# Patient Record
Sex: FEMALE | Race: WHITE | NOT HISPANIC OR LATINO | Employment: UNEMPLOYED | URBAN - METROPOLITAN AREA
[De-identification: names, ages, dates, MRNs, and addresses within clinical notes are randomized per-mention and may not be internally consistent; named-entity substitution may affect disease eponyms.]

---

## 2019-04-30 ENCOUNTER — HOSPITAL ENCOUNTER (EMERGENCY)
Facility: HOSPITAL | Age: 59
Discharge: HOME | End: 2019-04-30
Attending: INTERNAL MEDICINE
Payer: MEDICAID

## 2019-04-30 VITALS
OXYGEN SATURATION: 98 % | TEMPERATURE: 99 F | WEIGHT: 122 LBS | HEART RATE: 101 BPM | RESPIRATION RATE: 20 BRPM | BODY MASS INDEX: 20.33 KG/M2 | SYSTOLIC BLOOD PRESSURE: 128 MMHG | HEIGHT: 65 IN | DIASTOLIC BLOOD PRESSURE: 93 MMHG

## 2019-04-30 DIAGNOSIS — F31.9 BIPOLAR 1 DISORDER: Primary | ICD-10-CM

## 2019-04-30 LAB
ALBUMIN SERPL BCP-MCNC: 4.5 G/DL (ref 3.5–5.2)
ALP SERPL-CCNC: 56 U/L (ref 55–135)
ALT SERPL W/O P-5'-P-CCNC: 12 U/L (ref 10–44)
AMPHET+METHAMPHET UR QL: NEGATIVE
ANION GAP SERPL CALC-SCNC: 13 MMOL/L (ref 8–16)
AST SERPL-CCNC: 20 U/L (ref 10–40)
B-HCG UR QL: NEGATIVE
BARBITURATES UR QL SCN>200 NG/ML: NEGATIVE
BASOPHILS # BLD AUTO: 0.06 K/UL (ref 0–0.2)
BASOPHILS NFR BLD: 0.5 % (ref 0–1.9)
BENZODIAZ UR QL SCN>200 NG/ML: NEGATIVE
BILIRUB SERPL-MCNC: 1 MG/DL (ref 0.1–1)
BILIRUB UR QL STRIP: NEGATIVE
BUN SERPL-MCNC: 8 MG/DL (ref 6–20)
BZE UR QL SCN: NEGATIVE
CALCIUM SERPL-MCNC: 9.3 MG/DL (ref 8.7–10.5)
CANNABINOIDS UR QL SCN: NORMAL
CHLORIDE SERPL-SCNC: 103 MMOL/L (ref 95–110)
CLARITY UR: CLEAR
CO2 SERPL-SCNC: 21 MMOL/L (ref 23–29)
COLOR UR: YELLOW
CREAT SERPL-MCNC: 0.9 MG/DL (ref 0.5–1.4)
CREAT UR-MCNC: 220.8 MG/DL (ref 15–325)
DIFFERENTIAL METHOD: ABNORMAL
EOSINOPHIL # BLD AUTO: 0.1 K/UL (ref 0–0.5)
EOSINOPHIL NFR BLD: 0.8 % (ref 0–8)
ERYTHROCYTE [DISTWIDTH] IN BLOOD BY AUTOMATED COUNT: 23.5 % (ref 11.5–14.5)
EST. GFR  (AFRICAN AMERICAN): >60 ML/MIN/1.73 M^2
EST. GFR  (NON AFRICAN AMERICAN): >60 ML/MIN/1.73 M^2
GLUCOSE SERPL-MCNC: 92 MG/DL (ref 70–110)
GLUCOSE UR QL STRIP: NEGATIVE
HCT VFR BLD AUTO: 35.9 % (ref 37–48.5)
HGB BLD-MCNC: 10.6 G/DL (ref 12–16)
HGB UR QL STRIP: NEGATIVE
IMM GRANULOCYTES # BLD AUTO: 0.04 K/UL (ref 0–0.04)
IMM GRANULOCYTES NFR BLD AUTO: 0.3 % (ref 0–0.5)
KETONES UR QL STRIP: ABNORMAL
LEUKOCYTE ESTERASE UR QL STRIP: NEGATIVE
LYMPHOCYTES # BLD AUTO: 2.4 K/UL (ref 1–4.8)
LYMPHOCYTES NFR BLD: 19.3 % (ref 18–48)
MCH RBC QN AUTO: 21.4 PG (ref 27–31)
MCHC RBC AUTO-ENTMCNC: 29.5 G/DL (ref 32–36)
MCV RBC AUTO: 72 FL (ref 82–98)
MONOCYTES # BLD AUTO: 0.8 K/UL (ref 0.3–1)
MONOCYTES NFR BLD: 6.4 % (ref 4–15)
NEUTROPHILS # BLD AUTO: 9.1 K/UL (ref 1.8–7.7)
NEUTROPHILS NFR BLD: 72.7 % (ref 38–73)
NITRITE UR QL STRIP: NEGATIVE
NRBC BLD-RTO: 0 /100 WBC
OPIATES UR QL SCN: NEGATIVE
PCP UR QL SCN>25 NG/ML: NEGATIVE
PH UR STRIP: 5 [PH] (ref 5–8)
PLATELET # BLD AUTO: 371 K/UL (ref 150–350)
PMV BLD AUTO: 9.2 FL (ref 9.2–12.9)
POTASSIUM SERPL-SCNC: 3.6 MMOL/L (ref 3.5–5.1)
PROT SERPL-MCNC: 7.8 G/DL (ref 6–8.4)
PROT UR QL STRIP: NEGATIVE
RBC # BLD AUTO: 4.96 M/UL (ref 4–5.4)
SODIUM SERPL-SCNC: 137 MMOL/L (ref 136–145)
SP GR UR STRIP: 1.02 (ref 1–1.03)
TOXICOLOGY INFORMATION: NORMAL
TSH SERPL DL<=0.005 MIU/L-ACNC: 1.63 UIU/ML (ref 0.34–5.6)
URN SPEC COLLECT METH UR: ABNORMAL
UROBILINOGEN UR STRIP-ACNC: NEGATIVE EU/DL
WBC # BLD AUTO: 12.57 K/UL (ref 3.9–12.7)

## 2019-04-30 PROCEDURE — 25000003 PHARM REV CODE 250: Performed by: INTERNAL MEDICINE

## 2019-04-30 PROCEDURE — 80307 DRUG TEST PRSMV CHEM ANLYZR: CPT

## 2019-04-30 PROCEDURE — 81025 URINE PREGNANCY TEST: CPT

## 2019-04-30 PROCEDURE — 85025 COMPLETE CBC W/AUTO DIFF WBC: CPT

## 2019-04-30 PROCEDURE — 80053 COMPREHEN METABOLIC PANEL: CPT

## 2019-04-30 PROCEDURE — 99283 EMERGENCY DEPT VISIT LOW MDM: CPT

## 2019-04-30 PROCEDURE — 84443 ASSAY THYROID STIM HORMONE: CPT

## 2019-04-30 PROCEDURE — 81003 URINALYSIS AUTO W/O SCOPE: CPT | Mod: 59

## 2019-04-30 PROCEDURE — 36415 COLL VENOUS BLD VENIPUNCTURE: CPT

## 2019-04-30 RX ORDER — CARBAMAZEPINE 100 MG/1
100 TABLET, CHEWABLE ORAL 2 TIMES DAILY
Qty: 60 TABLET | Refills: 11 | Status: SHIPPED | OUTPATIENT
Start: 2019-04-30 | End: 2019-09-30 | Stop reason: SDUPTHER

## 2019-04-30 RX ORDER — OLANZAPINE 5 MG/1
10 TABLET, ORALLY DISINTEGRATING ORAL NIGHTLY
Status: DISCONTINUED | OUTPATIENT
Start: 2019-04-30 | End: 2019-04-30 | Stop reason: HOSPADM

## 2019-04-30 RX ORDER — ALPRAZOLAM 0.25 MG/1
0.25 TABLET ORAL
Status: COMPLETED | OUTPATIENT
Start: 2019-04-30 | End: 2019-04-30

## 2019-04-30 RX ORDER — RISPERIDONE 2 MG/1
2 TABLET ORAL 2 TIMES DAILY
Qty: 60 TABLET | Refills: 0 | Status: SHIPPED | OUTPATIENT
Start: 2019-04-30 | End: 2019-09-30 | Stop reason: SDUPTHER

## 2019-04-30 RX ORDER — OLANZAPINE 10 MG/2ML
2.5 INJECTION, POWDER, FOR SOLUTION INTRAMUSCULAR ONCE AS NEEDED
Status: DISCONTINUED | OUTPATIENT
Start: 2019-04-30 | End: 2019-04-30 | Stop reason: HOSPADM

## 2019-04-30 RX ADMIN — ALPRAZOLAM 0.25 MG: 0.25 TABLET ORAL at 03:04
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
## 2019-09-11 ENCOUNTER — HOSPITAL ENCOUNTER (EMERGENCY)
Facility: HOSPITAL | Age: 59
Discharge: PSYCHIATRIC OR SUBSTANCE USE FACILITY | End: 2019-09-11
Attending: EMERGENCY MEDICINE
Payer: MEDICAID

## 2019-09-11 VITALS
DIASTOLIC BLOOD PRESSURE: 76 MMHG | TEMPERATURE: 99 F | SYSTOLIC BLOOD PRESSURE: 108 MMHG | HEIGHT: 65 IN | OXYGEN SATURATION: 100 % | BODY MASS INDEX: 20.33 KG/M2 | HEART RATE: 73 BPM | RESPIRATION RATE: 16 BRPM | WEIGHT: 122 LBS

## 2019-09-11 DIAGNOSIS — F31.9 BIPOLAR DISORDER WITH PSYCHOTIC FEATURES: Primary | ICD-10-CM

## 2019-09-11 DIAGNOSIS — F12.10 CANNABIS ABUSE: ICD-10-CM

## 2019-09-11 DIAGNOSIS — F22 PARANOIA: ICD-10-CM

## 2019-09-11 LAB
ALBUMIN SERPL BCP-MCNC: 4.1 G/DL (ref 3.5–5.2)
ALP SERPL-CCNC: 62 U/L (ref 55–135)
ALT SERPL W/O P-5'-P-CCNC: 11 U/L (ref 10–44)
AMPHET+METHAMPHET UR QL: NEGATIVE
ANION GAP SERPL CALC-SCNC: 10 MMOL/L (ref 8–16)
APAP SERPL-MCNC: <10 UG/ML (ref 10–20)
AST SERPL-CCNC: 16 U/L (ref 10–40)
BACTERIA #/AREA URNS HPF: ABNORMAL /HPF
BASOPHILS # BLD AUTO: 0.05 K/UL (ref 0–0.2)
BASOPHILS NFR BLD: 0.5 % (ref 0–1.9)
BENZODIAZ UR QL SCN>200 NG/ML: NEGATIVE
BILIRUB SERPL-MCNC: 0.6 MG/DL (ref 0.1–1)
BILIRUB UR QL STRIP: ABNORMAL
BUN SERPL-MCNC: 9 MG/DL (ref 6–20)
BZE UR QL SCN: NEGATIVE
CALCIUM SERPL-MCNC: 9.2 MG/DL (ref 8.7–10.5)
CANNABINOIDS UR QL SCN: NORMAL
CHLORIDE SERPL-SCNC: 104 MMOL/L (ref 95–110)
CLARITY UR: CLEAR
CO2 SERPL-SCNC: 23 MMOL/L (ref 23–29)
COLOR UR: YELLOW
CREAT SERPL-MCNC: 0.7 MG/DL (ref 0.5–1.4)
CREAT UR-MCNC: 262.9 MG/DL (ref 15–325)
DIFFERENTIAL METHOD: ABNORMAL
EOSINOPHIL # BLD AUTO: 0.1 K/UL (ref 0–0.5)
EOSINOPHIL NFR BLD: 1 % (ref 0–8)
ERYTHROCYTE [DISTWIDTH] IN BLOOD BY AUTOMATED COUNT: 24.7 % (ref 11.5–14.5)
EST. GFR  (AFRICAN AMERICAN): >60 ML/MIN/1.73 M^2
EST. GFR  (NON AFRICAN AMERICAN): >60 ML/MIN/1.73 M^2
ETHANOL SERPL-MCNC: <5 MG/DL
GLUCOSE SERPL-MCNC: 101 MG/DL (ref 70–110)
GLUCOSE UR QL STRIP: NEGATIVE
HCT VFR BLD AUTO: 35.6 % (ref 37–48.5)
HGB BLD-MCNC: 10.6 G/DL (ref 12–16)
HGB UR QL STRIP: ABNORMAL
HYALINE CASTS #/AREA URNS LPF: 2 /LPF
IMM GRANULOCYTES # BLD AUTO: 0.04 K/UL (ref 0–0.04)
IMM GRANULOCYTES NFR BLD AUTO: 0.4 % (ref 0–0.5)
KETONES UR QL STRIP: ABNORMAL
LEUKOCYTE ESTERASE UR QL STRIP: NEGATIVE
LYMPHOCYTES # BLD AUTO: 1.8 K/UL (ref 1–4.8)
LYMPHOCYTES NFR BLD: 18.1 % (ref 18–48)
MCH RBC QN AUTO: 21.4 PG (ref 27–31)
MCHC RBC AUTO-ENTMCNC: 29.8 G/DL (ref 32–36)
MCV RBC AUTO: 72 FL (ref 82–98)
MICROSCOPIC COMMENT: ABNORMAL
MONOCYTES # BLD AUTO: 0.5 K/UL (ref 0.3–1)
MONOCYTES NFR BLD: 5.5 % (ref 4–15)
NEUTROPHILS # BLD AUTO: 7.4 K/UL (ref 1.8–7.7)
NEUTROPHILS NFR BLD: 74.5 % (ref 38–73)
NITRITE UR QL STRIP: NEGATIVE
NRBC BLD-RTO: 0 /100 WBC
OPIATES UR QL SCN: NEGATIVE
PCP UR QL SCN>25 NG/ML: NEGATIVE
PH UR STRIP: 5 [PH] (ref 5–8)
PLATELET # BLD AUTO: 375 K/UL (ref 150–350)
PMV BLD AUTO: 9.4 FL (ref 9.2–12.9)
POTASSIUM SERPL-SCNC: 3.8 MMOL/L (ref 3.5–5.1)
PROT SERPL-MCNC: 7.6 G/DL (ref 6–8.4)
PROT UR QL STRIP: ABNORMAL
RBC # BLD AUTO: 4.95 M/UL (ref 4–5.4)
RBC #/AREA URNS HPF: 10 /HPF (ref 0–4)
SALICYLATES SERPL-MCNC: <4 MG/DL (ref 15–30)
SODIUM SERPL-SCNC: 137 MMOL/L (ref 136–145)
SP GR UR STRIP: 1.02 (ref 1–1.03)
SQUAMOUS #/AREA URNS HPF: 5 /HPF
TOXICOLOGY INFORMATION: NORMAL
TSH SERPL DL<=0.005 MIU/L-ACNC: 1.27 UIU/ML (ref 0.34–5.6)
URN SPEC COLLECT METH UR: ABNORMAL
UROBILINOGEN UR STRIP-ACNC: NEGATIVE EU/DL
WBC # BLD AUTO: 9.87 K/UL (ref 3.9–12.7)
WBC #/AREA URNS HPF: 3 /HPF (ref 0–5)

## 2019-09-11 PROCEDURE — 80053 COMPREHEN METABOLIC PANEL: CPT

## 2019-09-11 PROCEDURE — 63600175 PHARM REV CODE 636 W HCPCS: Performed by: EMERGENCY MEDICINE

## 2019-09-11 PROCEDURE — 99285 EMERGENCY DEPT VISIT HI MDM: CPT | Mod: 25

## 2019-09-11 PROCEDURE — 36415 COLL VENOUS BLD VENIPUNCTURE: CPT

## 2019-09-11 PROCEDURE — 85025 COMPLETE CBC W/AUTO DIFF WBC: CPT

## 2019-09-11 PROCEDURE — 80329 ANALGESICS NON-OPIOID 1 OR 2: CPT

## 2019-09-11 PROCEDURE — 84443 ASSAY THYROID STIM HORMONE: CPT

## 2019-09-11 PROCEDURE — 80320 DRUG SCREEN QUANTALCOHOLS: CPT

## 2019-09-11 PROCEDURE — 96372 THER/PROPH/DIAG INJ SC/IM: CPT | Mod: 59

## 2019-09-11 PROCEDURE — 81000 URINALYSIS NONAUTO W/SCOPE: CPT | Mod: 59

## 2019-09-11 PROCEDURE — 80307 DRUG TEST PRSMV CHEM ANLYZR: CPT

## 2019-09-11 RX ORDER — DIPHENHYDRAMINE HYDROCHLORIDE 50 MG/ML
25 INJECTION INTRAMUSCULAR; INTRAVENOUS
Status: COMPLETED | OUTPATIENT
Start: 2019-09-11 | End: 2019-09-11

## 2019-09-11 RX ORDER — LORAZEPAM 2 MG/ML
2 INJECTION INTRAMUSCULAR
Status: COMPLETED | OUTPATIENT
Start: 2019-09-11 | End: 2019-09-11

## 2019-09-11 RX ORDER — HALOPERIDOL 5 MG/ML
5 INJECTION INTRAMUSCULAR
Status: COMPLETED | OUTPATIENT
Start: 2019-09-11 | End: 2019-09-11

## 2019-09-11 RX ADMIN — LORAZEPAM 2 MG: 2 INJECTION INTRAMUSCULAR; INTRAVENOUS at 03:09

## 2019-09-11 RX ADMIN — HALOPERIDOL LACTATE 5 MG: 5 INJECTION, SOLUTION INTRAMUSCULAR at 03:09

## 2019-09-11 RX ADMIN — DIPHENHYDRAMINE HYDROCHLORIDE 25 MG: 50 INJECTION, SOLUTION INTRAMUSCULAR; INTRAVENOUS at 03:09
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
## 2019-09-29 ENCOUNTER — HOSPITAL ENCOUNTER (EMERGENCY)
Facility: HOSPITAL | Age: 59
Discharge: HOME | End: 2019-09-30
Attending: EMERGENCY MEDICINE
Payer: MEDICAID

## 2019-09-29 DIAGNOSIS — F25.9 SCHIZOAFFECTIVE DISORDER, UNSPECIFIED TYPE: Primary | ICD-10-CM

## 2019-09-29 DIAGNOSIS — F31.9 BIPOLAR 1 DISORDER: ICD-10-CM

## 2019-09-29 PROCEDURE — 99284 EMERGENCY DEPT VISIT MOD MDM: CPT | Mod: 25

## 2019-09-29 PROCEDURE — 96372 THER/PROPH/DIAG INJ SC/IM: CPT

## 2019-09-29 RX ORDER — OLANZAPINE 10 MG/2ML
10 INJECTION, POWDER, FOR SOLUTION INTRAMUSCULAR ONCE AS NEEDED
Status: COMPLETED | OUTPATIENT
Start: 2019-09-30 | End: 2019-09-29

## 2019-09-29 RX ADMIN — OLANZAPINE 10 MG: 10 INJECTION, POWDER, FOR SOLUTION INTRAMUSCULAR at 11:09

## 2019-09-30 VITALS
DIASTOLIC BLOOD PRESSURE: 78 MMHG | TEMPERATURE: 98 F | RESPIRATION RATE: 16 BRPM | HEART RATE: 98 BPM | SYSTOLIC BLOOD PRESSURE: 120 MMHG | WEIGHT: 121 LBS | BODY MASS INDEX: 20.16 KG/M2 | OXYGEN SATURATION: 98 % | HEIGHT: 65 IN

## 2019-09-30 PROCEDURE — 25000003 PHARM REV CODE 250: Performed by: EMERGENCY MEDICINE

## 2019-09-30 PROCEDURE — S0166 INJ OLANZAPINE 2.5MG: HCPCS | Performed by: EMERGENCY MEDICINE

## 2019-09-30 RX ORDER — CARBAMAZEPINE 100 MG/1
100 TABLET, CHEWABLE ORAL 2 TIMES DAILY
Qty: 60 TABLET | Refills: 11 | Status: SHIPPED | OUTPATIENT
Start: 2019-09-30 | End: 2020-05-11 | Stop reason: CLARIF

## 2019-09-30 RX ORDER — RISPERIDONE 2 MG/1
2 TABLET ORAL 2 TIMES DAILY
Qty: 60 TABLET | Refills: 0 | Status: ON HOLD | OUTPATIENT
Start: 2019-09-30 | End: 2021-04-12

## 2019-09-30 NOTE — ED NOTES
Pt encouraged to get some rest as she reports poor sleep.  Pt given warm blanket and pillow.  Lights dimmed and door left cracked.
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
## 2019-09-30 NOTE — DISCHARGE INSTRUCTIONS
Resume medications as previous  Follow-up with [facility]  Return to the ER as needed for any acute worsening

## 2019-10-02 ENCOUNTER — HOSPITAL ENCOUNTER (EMERGENCY)
Facility: HOSPITAL | Age: 59
Discharge: HOME | End: 2019-10-02
Attending: FAMILY MEDICINE
Payer: MEDICAID

## 2019-10-02 VITALS
WEIGHT: 130 LBS | HEART RATE: 102 BPM | DIASTOLIC BLOOD PRESSURE: 88 MMHG | BODY MASS INDEX: 23.04 KG/M2 | TEMPERATURE: 98 F | SYSTOLIC BLOOD PRESSURE: 133 MMHG | OXYGEN SATURATION: 98 % | RESPIRATION RATE: 20 BRPM | HEIGHT: 63 IN

## 2019-10-02 DIAGNOSIS — F29 PSYCHOSIS, UNSPECIFIED PSYCHOSIS TYPE: ICD-10-CM

## 2019-10-02 DIAGNOSIS — R45.1 AGITATION: Primary | ICD-10-CM

## 2019-10-02 LAB
ALBUMIN SERPL BCP-MCNC: 3.7 G/DL (ref 3.5–5.2)
ALP SERPL-CCNC: 57 U/L (ref 55–135)
ALT SERPL W/O P-5'-P-CCNC: 11 U/L (ref 10–44)
AMPHET+METHAMPHET UR QL: NEGATIVE
ANION GAP SERPL CALC-SCNC: 12 MMOL/L (ref 8–16)
APAP SERPL-MCNC: <10 UG/ML (ref 10–20)
AST SERPL-CCNC: 15 U/L (ref 10–40)
BARBITURATES UR QL SCN>200 NG/ML: NEGATIVE
BASOPHILS # BLD AUTO: 0.07 K/UL (ref 0–0.2)
BASOPHILS NFR BLD: 0.7 % (ref 0–1.9)
BENZODIAZ UR QL SCN>200 NG/ML: NEGATIVE
BILIRUB SERPL-MCNC: 0.3 MG/DL (ref 0.1–1)
BILIRUB UR QL STRIP: NEGATIVE
BUN SERPL-MCNC: 14 MG/DL (ref 6–20)
BZE UR QL SCN: NEGATIVE
CALCIUM SERPL-MCNC: 8.9 MG/DL (ref 8.7–10.5)
CANNABINOIDS UR QL SCN: NORMAL
CHLORIDE SERPL-SCNC: 101 MMOL/L (ref 95–110)
CLARITY UR: CLEAR
CO2 SERPL-SCNC: 25 MMOL/L (ref 23–29)
COLOR UR: YELLOW
CREAT SERPL-MCNC: 0.8 MG/DL (ref 0.5–1.4)
CREAT UR-MCNC: 106.3 MG/DL (ref 15–325)
DIFFERENTIAL METHOD: ABNORMAL
EOSINOPHIL # BLD AUTO: 0.2 K/UL (ref 0–0.5)
EOSINOPHIL NFR BLD: 1.6 % (ref 0–8)
ERYTHROCYTE [DISTWIDTH] IN BLOOD BY AUTOMATED COUNT: 25.6 % (ref 11.5–14.5)
EST. GFR  (AFRICAN AMERICAN): >60 ML/MIN/1.73 M^2
EST. GFR  (NON AFRICAN AMERICAN): >60 ML/MIN/1.73 M^2
ETHANOL SERPL-MCNC: <5 MG/DL
GLUCOSE SERPL-MCNC: 89 MG/DL (ref 70–110)
GLUCOSE UR QL STRIP: NEGATIVE
HCT VFR BLD AUTO: 29.6 % (ref 37–48.5)
HGB BLD-MCNC: 9.1 G/DL (ref 12–16)
HGB UR QL STRIP: ABNORMAL
IMM GRANULOCYTES # BLD AUTO: 0.03 K/UL (ref 0–0.04)
IMM GRANULOCYTES NFR BLD AUTO: 0.3 % (ref 0–0.5)
KETONES UR QL STRIP: NEGATIVE
LEUKOCYTE ESTERASE UR QL STRIP: NEGATIVE
LYMPHOCYTES # BLD AUTO: 1.9 K/UL (ref 1–4.8)
LYMPHOCYTES NFR BLD: 18.5 % (ref 18–48)
MCH RBC QN AUTO: 22.3 PG (ref 27–31)
MCHC RBC AUTO-ENTMCNC: 30.7 G/DL (ref 32–36)
MCV RBC AUTO: 73 FL (ref 82–98)
MICROSCOPIC COMMENT: ABNORMAL
MONOCYTES # BLD AUTO: 1.1 K/UL (ref 0.3–1)
MONOCYTES NFR BLD: 10.8 % (ref 4–15)
NEUTROPHILS # BLD AUTO: 6.9 K/UL (ref 1.8–7.7)
NEUTROPHILS NFR BLD: 68.1 % (ref 38–73)
NITRITE UR QL STRIP: NEGATIVE
NRBC BLD-RTO: 0 /100 WBC
OPIATES UR QL SCN: NEGATIVE
PCP UR QL SCN>25 NG/ML: NEGATIVE
PH UR STRIP: 7 [PH] (ref 5–8)
PLATELET # BLD AUTO: 312 K/UL (ref 150–350)
PMV BLD AUTO: 9.8 FL (ref 9.2–12.9)
POTASSIUM SERPL-SCNC: 3.9 MMOL/L (ref 3.5–5.1)
PROT SERPL-MCNC: 6.9 G/DL (ref 6–8.4)
PROT UR QL STRIP: NEGATIVE
RBC # BLD AUTO: 4.08 M/UL (ref 4–5.4)
RBC #/AREA URNS HPF: 50 /HPF (ref 0–4)
SODIUM SERPL-SCNC: 138 MMOL/L (ref 136–145)
SP GR UR STRIP: 1.02 (ref 1–1.03)
TOXICOLOGY INFORMATION: NORMAL
TSH SERPL DL<=0.005 MIU/L-ACNC: 1.67 UIU/ML (ref 0.34–5.6)
URN SPEC COLLECT METH UR: ABNORMAL
UROBILINOGEN UR STRIP-ACNC: NEGATIVE EU/DL
WBC # BLD AUTO: 10.1 K/UL (ref 3.9–12.7)

## 2019-10-02 PROCEDURE — 80307 DRUG TEST PRSMV CHEM ANLYZR: CPT

## 2019-10-02 PROCEDURE — 84443 ASSAY THYROID STIM HORMONE: CPT

## 2019-10-02 PROCEDURE — 80320 DRUG SCREEN QUANTALCOHOLS: CPT

## 2019-10-02 PROCEDURE — 81000 URINALYSIS NONAUTO W/SCOPE: CPT | Mod: 59

## 2019-10-02 PROCEDURE — 96372 THER/PROPH/DIAG INJ SC/IM: CPT

## 2019-10-02 PROCEDURE — 25000003 PHARM REV CODE 250: Performed by: FAMILY MEDICINE

## 2019-10-02 PROCEDURE — 99284 EMERGENCY DEPT VISIT MOD MDM: CPT | Mod: 25

## 2019-10-02 PROCEDURE — 80053 COMPREHEN METABOLIC PANEL: CPT

## 2019-10-02 PROCEDURE — 80329 ANALGESICS NON-OPIOID 1 OR 2: CPT

## 2019-10-02 PROCEDURE — 36415 COLL VENOUS BLD VENIPUNCTURE: CPT

## 2019-10-02 PROCEDURE — 63600175 PHARM REV CODE 636 W HCPCS: Performed by: FAMILY MEDICINE

## 2019-10-02 PROCEDURE — 85025 COMPLETE CBC W/AUTO DIFF WBC: CPT

## 2019-10-02 RX ORDER — OLANZAPINE 5 MG/1
10 TABLET, ORALLY DISINTEGRATING ORAL NIGHTLY
Status: DISCONTINUED | OUTPATIENT
Start: 2019-10-02 | End: 2019-10-02

## 2019-10-02 RX ORDER — OLANZAPINE 5 MG/1
10 TABLET, ORALLY DISINTEGRATING ORAL
Status: COMPLETED | OUTPATIENT
Start: 2019-10-02 | End: 2019-10-02

## 2019-10-02 RX ORDER — LORAZEPAM 2 MG/ML
1 INJECTION INTRAMUSCULAR
Status: COMPLETED | OUTPATIENT
Start: 2019-10-02 | End: 2019-10-02

## 2019-10-02 RX ADMIN — LORAZEPAM 1 MG: 2 INJECTION INTRAMUSCULAR; INTRAVENOUS at 06:10

## 2019-10-02 RX ADMIN — OLANZAPINE 10 MG: 5 TABLET, ORALLY DISINTEGRATING ORAL at 06:10
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
## 2019-10-03 NOTE — ED NOTES
Dr Kulkarni at bedside for re-evaluation. Updated on discharge status. Pt awake and alert. Respirations non labored. Calm and cooperative. No acute distress.

## 2019-10-03 NOTE — ED PROVIDER NOTES
Encounter Date: 10/2/2019       History     Chief Complaint   Patient presents with    Mental Health Problem     Patient complaining of hearing voices through the wall coming after her, was seen for same on 9/29/19     58-year-old female presents complaining I just want my lab check to see if there is anything wrong patient states she has been concerned about her neighbors plotting against her she denies any overt suicidal or homicidal ideation she denies auditory or visual hallucinations states she is taking her normal psychiatric medication she has had a history of schizophrenia and bipolar disorder, she is not interested in another psychiatric evaluation or admission and was seen at [facility] in [facility] approximately 3 and half weeks ago having been transferred from the ED here        Review of patient's allergies indicates:   Allergen Reactions    Codeine      Past Medical History:   Diagnosis Date    Bipolar affective disorder, manic, severe, with psychotic behavior 8/14/2014    Depression     Fatigue     History of psychiatric care     History of psychiatric hospitalization     Psychiatric problem     Psychosis 5/13/2015    Schizoaffective disorder      Past Surgical History:   Procedure Laterality Date    ANKLE SURGERY Right      Family History   Problem Relation Age of Onset    No Known Problems Mother     No Known Problems Father     No Known Problems Sister     No Known Problems Brother     No Known Problems Maternal Aunt     No Known Problems Paternal Aunt     No Known Problems Maternal Uncle     No Known Problems Paternal Uncle     No Known Problems Maternal Grandfather     No Known Problems Maternal Grandmother     No Known Problems Paternal Grandfather     No Known Problems Paternal Grandmother     No Known Problems Cousin     ADD / ADHD Neg Hx     Alcohol abuse Neg Hx     Anxiety disorder Neg Hx     Bipolar disorder Neg Hx     Dementia Neg Hx     Depression Neg Hx      Drug abuse Neg Hx     OCD Neg Hx     Paranoid behavior Neg Hx     Physical abuse Neg Hx     Schizophrenia Neg Hx     Seizures Neg Hx     Self injury Neg Hx     Sexual abuse Neg Hx     Suicide Neg Hx      Social History     Tobacco Use    Smoking status: Current Every Day Smoker     Packs/day: 0.50     Years: 34.00     Pack years: 17.00     Types: Cigarettes    Smokeless tobacco: Never Used   Substance Use Topics    Alcohol use: No    Drug use: Yes     Types: Marijuana     Review of Systems   Constitutional: Negative for fever.   HENT: Negative for sore throat.    Respiratory: Negative for shortness of breath.    Cardiovascular: Negative for chest pain.   Gastrointestinal: Negative for nausea.   Genitourinary: Negative for dysuria.   Musculoskeletal: Negative for back pain.   Skin: Negative for rash.   Neurological: Negative for weakness.   Hematological: Does not bruise/bleed easily.   Psychiatric/Behavioral: Negative for agitation, self-injury and sleep disturbance. The patient is nervous/anxious.        Physical Exam     Initial Vitals [10/02/19 1811]   BP Pulse Resp Temp SpO2   133/88 102 20 98.1 °F (36.7 °C) 98 %      MAP       --         Physical Exam    Nursing note and vitals reviewed.  Constitutional: She appears well-developed and well-nourished. She is not diaphoretic. No distress.   HENT:   Head: Normocephalic and atraumatic.   Right Ear: External ear normal.   Left Ear: External ear normal.   Eyes: Pupils are equal, round, and reactive to light. Right eye exhibits no discharge. Left eye exhibits no discharge.   Neck: No tracheal deviation present. No JVD present.   Cardiovascular: Exam reveals no friction rub.    No murmur heard.  Pulmonary/Chest: No stridor. No respiratory distress. She has no wheezes. She has no rales.   Abdominal: Bowel sounds are normal. She exhibits no distension.   Musculoskeletal: Normal range of motion.   Neurological: She is alert.   Skin: Skin is warm.    Psychiatric:   Patient has some mild flight of ideas but is generally oriented and can answer questions appropriately         ED Course   Procedures  Labs Reviewed   CBC W/ AUTO DIFFERENTIAL - Abnormal; Notable for the following components:       Result Value    Hemoglobin 9.1 (*)     Hematocrit 29.6 (*)     Mean Corpuscular Volume 73 (*)     Mean Corpuscular Hemoglobin 22.3 (*)     Mean Corpuscular Hemoglobin Conc 30.7 (*)     RDW 25.6 (*)     Mono # 1.1 (*)     All other components within normal limits   URINALYSIS, REFLEX TO URINE CULTURE - Abnormal; Notable for the following components:    Occult Blood UA 2+ (*)     All other components within normal limits    Narrative:     Preferred Collection Type->Urine, Clean Catch   URINALYSIS MICROSCOPIC - Abnormal; Notable for the following components:    RBC, UA 50 (*)     All other components within normal limits    Narrative:     Preferred Collection Type->Urine, Clean Catch   COMPREHENSIVE METABOLIC PANEL   TSH   DRUG SCREEN PANEL, URINE EMERGENCY    Narrative:     Preferred Collection Type->Urine, Clean Catch   ALCOHOL,MEDICAL (ETHANOL)   ACETAMINOPHEN LEVEL          Imaging Results    None                               Clinical Impression:       ICD-10-CM ICD-9-CM   1. Agitation R45.1 307.9   2. Psychosis, unspecified psychosis type F29 298.9                                Trey Kulkarni MD  10/03/19 0326       Trey Kulkarni MD  10/11/19 1122

## 2019-10-08 ENCOUNTER — HOSPITAL ENCOUNTER (EMERGENCY)
Facility: HOSPITAL | Age: 59
Discharge: HOME | End: 2019-10-08
Attending: INTERNAL MEDICINE
Payer: MEDICAID

## 2019-10-08 VITALS
BODY MASS INDEX: 20.99 KG/M2 | WEIGHT: 126 LBS | HEIGHT: 65 IN | RESPIRATION RATE: 16 BRPM | SYSTOLIC BLOOD PRESSURE: 119 MMHG | TEMPERATURE: 98 F | DIASTOLIC BLOOD PRESSURE: 85 MMHG | HEART RATE: 116 BPM | OXYGEN SATURATION: 98 %

## 2019-10-08 DIAGNOSIS — R00.2 PALPITATIONS: ICD-10-CM

## 2019-10-08 DIAGNOSIS — F41.9 ANXIETY: Primary | ICD-10-CM

## 2019-10-08 DIAGNOSIS — D50.0 IRON DEFICIENCY ANEMIA DUE TO CHRONIC BLOOD LOSS: ICD-10-CM

## 2019-10-08 DIAGNOSIS — R06.02 SOB (SHORTNESS OF BREATH): ICD-10-CM

## 2019-10-08 LAB
BASOPHILS # BLD AUTO: 0.06 K/UL (ref 0–0.2)
BASOPHILS NFR BLD: 0.8 % (ref 0–1.9)
DIFFERENTIAL METHOD: ABNORMAL
EOSINOPHIL # BLD AUTO: 0.1 K/UL (ref 0–0.5)
EOSINOPHIL NFR BLD: 1.4 % (ref 0–8)
ERYTHROCYTE [DISTWIDTH] IN BLOOD BY AUTOMATED COUNT: 25.1 % (ref 11.5–14.5)
HCT VFR BLD AUTO: 31.2 % (ref 37–48.5)
HGB BLD-MCNC: 9.6 G/DL (ref 12–16)
IMM GRANULOCYTES # BLD AUTO: 0.04 K/UL (ref 0–0.04)
IMM GRANULOCYTES NFR BLD AUTO: 0.5 % (ref 0–0.5)
IRON SERPL-MCNC: 22 UG/DL (ref 30–160)
LYMPHOCYTES # BLD AUTO: 1.8 K/UL (ref 1–4.8)
LYMPHOCYTES NFR BLD: 23.5 % (ref 18–48)
MCH RBC QN AUTO: 22.3 PG (ref 27–31)
MCHC RBC AUTO-ENTMCNC: 30.8 G/DL (ref 32–36)
MCV RBC AUTO: 73 FL (ref 82–98)
MONOCYTES # BLD AUTO: 0.9 K/UL (ref 0.3–1)
MONOCYTES NFR BLD: 12.2 % (ref 4–15)
NEUTROPHILS # BLD AUTO: 4.7 K/UL (ref 1.8–7.7)
NEUTROPHILS NFR BLD: 61.6 % (ref 38–73)
NRBC BLD-RTO: 0 /100 WBC
PLATELET # BLD AUTO: 416 K/UL (ref 150–350)
PMV BLD AUTO: 8.8 FL (ref 9.2–12.9)
RBC # BLD AUTO: 4.3 M/UL (ref 4–5.4)
SATURATED IRON: 4 % (ref 20–50)
TOTAL IRON BINDING CAPACITY: 533 UG/DL (ref 250–450)
TRANSFERRIN SERPL-MCNC: 360 MG/DL (ref 200–375)
WBC # BLD AUTO: 7.65 K/UL (ref 3.9–12.7)

## 2019-10-08 PROCEDURE — 93005 ELECTROCARDIOGRAM TRACING: CPT

## 2019-10-08 PROCEDURE — 85025 COMPLETE CBC W/AUTO DIFF WBC: CPT

## 2019-10-08 PROCEDURE — 93010 ELECTROCARDIOGRAM REPORT: CPT | Mod: ,,, | Performed by: INTERNAL MEDICINE

## 2019-10-08 PROCEDURE — 99285 EMERGENCY DEPT VISIT HI MDM: CPT | Mod: 25

## 2019-10-08 PROCEDURE — 71046 X-RAY EXAM CHEST 2 VIEWS: CPT | Mod: 26,,, | Performed by: RADIOLOGY

## 2019-10-08 PROCEDURE — 25000003 PHARM REV CODE 250: Performed by: INTERNAL MEDICINE

## 2019-10-08 PROCEDURE — 93010 EKG 12-LEAD: ICD-10-PCS | Mod: ,,, | Performed by: INTERNAL MEDICINE

## 2019-10-08 PROCEDURE — 71046 X-RAY EXAM CHEST 2 VIEWS: CPT | Mod: TC,FY

## 2019-10-08 PROCEDURE — 71046 XR CHEST PA AND LATERAL: ICD-10-PCS | Mod: 26,,, | Performed by: RADIOLOGY

## 2019-10-08 PROCEDURE — 83540 ASSAY OF IRON: CPT

## 2019-10-08 PROCEDURE — 36415 COLL VENOUS BLD VENIPUNCTURE: CPT

## 2019-10-08 RX ORDER — ALPRAZOLAM 0.25 MG/1
0.25 TABLET ORAL
Status: COMPLETED | OUTPATIENT
Start: 2019-10-08 | End: 2019-10-08

## 2019-10-08 RX ORDER — LABETALOL 100 MG/1
200 TABLET, FILM COATED ORAL
Status: COMPLETED | OUTPATIENT
Start: 2019-10-08 | End: 2019-10-08

## 2019-10-08 RX ADMIN — ALPRAZOLAM 0.25 MG: 0.25 TABLET ORAL at 05:10

## 2019-10-08 RX ADMIN — LABETALOL HYDROCHLORIDE 200 MG: 100 TABLET, FILM COATED ORAL at 05:10

## 2019-10-08 NOTE — ED PROVIDER NOTES
Encounter Date: 10/8/2019       History     Chief Complaint   Patient presents with    Anxiety     felt like heart was racing.     Patient presented to this ED complaining of heart racing and severe anxiety. She states the medicine she was prescribed are not working.  The medicines were risperidone 3 mg and car band was seen 100 mg.  These are prescribed by the ER physician.  She has not established any primary care and has not seen [facility].  She has a diagnosis of bipolar or schizo affective disorder.  She has not having delusions or hallucinations she just did not feel right she states the for heart feels like it is has pressure in his racing.  She has no chest pain.    The patient is alert oriented to person place and time.  She has no delusions or hallucinations.  Her speech is fluent and coherent.  She has a hard time describing her discomfort but more as a palpitation or pressure.  She states she just can't sleep.  She has been up several nights in a row.            Review of patient's allergies indicates:   Allergen Reactions    Codeine      Past Medical History:   Diagnosis Date    Bipolar affective disorder, manic, severe, with psychotic behavior 8/14/2014    Depression     Fatigue     History of psychiatric care     History of psychiatric hospitalization     Psychiatric problem     Psychosis 5/13/2015    Schizoaffective disorder      Past Surgical History:   Procedure Laterality Date    ANKLE SURGERY Right      Family History   Problem Relation Age of Onset    No Known Problems Mother     No Known Problems Father     No Known Problems Sister     No Known Problems Brother     No Known Problems Maternal Aunt     No Known Problems Paternal Aunt     No Known Problems Maternal Uncle     No Known Problems Paternal Uncle     No Known Problems Maternal Grandfather     No Known Problems Maternal Grandmother     No Known Problems Paternal Grandfather     No Known Problems  Paternal Grandmother     No Known Problems Cousin     ADD / ADHD Neg Hx     Alcohol abuse Neg Hx     Anxiety disorder Neg Hx     Bipolar disorder Neg Hx     Dementia Neg Hx     Depression Neg Hx     Drug abuse Neg Hx     OCD Neg Hx     Paranoid behavior Neg Hx     Physical abuse Neg Hx     Schizophrenia Neg Hx     Seizures Neg Hx     Self injury Neg Hx     Sexual abuse Neg Hx     Suicide Neg Hx      Social History     Tobacco Use    Smoking status: Current Every Day Smoker     Packs/day: 0.50     Years: 34.00     Pack years: 17.00     Types: Cigarettes    Smokeless tobacco: Never Used   Substance Use Topics    Alcohol use: No    Drug use: Yes     Types: Marijuana     Review of Systems   Constitutional: Negative for fever.   HENT: Negative for sore throat.    Respiratory: Negative for shortness of breath.    Cardiovascular: Negative for chest pain.   Gastrointestinal: Negative for nausea.   Genitourinary: Negative for dysuria.   Musculoskeletal: Negative for back pain.   Skin: Negative for rash.   Neurological: Positive for dizziness. Negative for weakness.   Hematological: Does not bruise/bleed easily.   Psychiatric/Behavioral: Positive for dysphoric mood. The patient is nervous/anxious.    All other systems reviewed and are negative.      Physical Exam     Initial Vitals [10/08/19 1658]   BP Pulse Resp Temp SpO2   119/85 (!) 116 16 98.2 °F (36.8 °C) 98 %      MAP       --         Physical Exam    Nursing note and vitals reviewed.  Constitutional: Vital signs are normal. She appears well-developed and well-nourished. She is active and cooperative.   HENT:   Head: Normocephalic and atraumatic.   Eyes: Conjunctivae, EOM and lids are normal. Pupils are equal, round, and reactive to light. Lids are everted and swept, no foreign bodies found.   Neck: Trachea normal, normal range of motion and full passive range of motion without pain. Neck supple.   Cardiovascular: Normal rate, regular rhythm, S1  normal, S2 normal, normal heart sounds, intact distal pulses and normal pulses.  No extrasystoles are present.    Pulmonary/Chest: Breath sounds normal.   Abdominal: Soft. Normal appearance and bowel sounds are normal.   Musculoskeletal: Normal range of motion.   Neurological: She is alert and oriented to person, place, and time. She has normal reflexes. GCS eye subscore is 4. GCS verbal subscore is 5. GCS motor subscore is 6.   Skin: Skin is warm, dry and intact. Capillary refill takes less than 2 seconds.   Psychiatric: Her speech is rapid and/or pressured and tangential. She is hyperactive. Cognition and memory are normal. She expresses inappropriate judgment.         ED Course   Procedures  Labs Reviewed   CBC W/ AUTO DIFFERENTIAL - Abnormal; Notable for the following components:       Result Value    Hemoglobin 9.6 (*)     Hematocrit 31.2 (*)     Mean Corpuscular Volume 73 (*)     Mean Corpuscular Hemoglobin 22.3 (*)     Mean Corpuscular Hemoglobin Conc 30.8 (*)     RDW 25.1 (*)     Platelets 416 (*)     MPV 8.8 (*)     All other components within normal limits   IRON AND TIBC     EKG Readings: (Independently Interpreted)   Initial Reading: No STEMI. Rhythm: Normal Sinus Rhythm. Ectopy: No Ectopy. Conduction: Normal. ST Segments: Normal ST Segments. T Waves: Normal. Axis: Normal. Clinical Impression: Normal Sinus Rhythm       Imaging Results          X-Ray Chest PA And Lateral (In process)                  Medical Decision Making:   Clinical Tests:   Lab Tests: Ordered and Reviewed  The following lab test(s) were unremarkable: CBC and CMP       <> Summary of Lab: Laboratory shows evidence of an iron deficient anemia.  Medical Tests: Ordered and Reviewed  ED Management:  EKG is normal.    Patient comes in with anxiety. She improved Xanax and labetalol.  She does have evidence of an iron deficiency anemia.  This requires for further workup and treatment.  Anxiety is 1 of the side effects of iron deficiency  anemia.  Patient was seen and discharged by Dr. Barroso and recommended continuing her current medications which is Risperdal and Tegretol.    Patient should be contacted and iron prescribed every other day.                      Clinical Impression:       ICD-10-CM ICD-9-CM   1. Anxiety F41.9 300.00   2. Palpitations R00.2 785.1   3. SOB (shortness of breath) R06.02 786.05   4. Iron deficiency anemia due to chronic blood loss D50.0 280.0         Disposition:   Disposition: Discharged  Condition: Stable                        Silvano Costello MD  10/09/19 0710

## 2019-10-08 NOTE — ED TRIAGE NOTES
Pt stated she feels frantic, that she is not taking the xanax.  The nerve medication she was given she says it is not working and making her normal.

## 2020-01-19 ENCOUNTER — HOSPITAL ENCOUNTER (EMERGENCY)
Facility: HOSPITAL | Age: 60
Discharge: PSYCHIATRIC OR SUBSTANCE USE FACILITY | End: 2020-01-19
Attending: EMERGENCY MEDICINE
Payer: MEDICAID

## 2020-01-19 VITALS
BODY MASS INDEX: 22.2 KG/M2 | TEMPERATURE: 98 F | HEIGHT: 64 IN | SYSTOLIC BLOOD PRESSURE: 102 MMHG | WEIGHT: 130 LBS | HEART RATE: 77 BPM | OXYGEN SATURATION: 99 % | RESPIRATION RATE: 19 BRPM | DIASTOLIC BLOOD PRESSURE: 74 MMHG

## 2020-01-19 DIAGNOSIS — F20.0 PARANOID SCHIZOPHRENIA: ICD-10-CM

## 2020-01-19 DIAGNOSIS — F23 ACUTE PSYCHOSIS: ICD-10-CM

## 2020-01-19 DIAGNOSIS — M54.9 BACK PAIN, UNSPECIFIED BACK LOCATION, UNSPECIFIED BACK PAIN LATERALITY, UNSPECIFIED CHRONICITY: Primary | ICD-10-CM

## 2020-01-19 DIAGNOSIS — R44.0 SEVERE AUDITORY HALLUCINATIONS: ICD-10-CM

## 2020-01-19 DIAGNOSIS — R45.850 HOMICIDAL THOUGHTS: ICD-10-CM

## 2020-01-19 DIAGNOSIS — R44.1 CONTINUOUS VISUAL HALLUCINATIONS: ICD-10-CM

## 2020-01-19 LAB
ALBUMIN SERPL BCP-MCNC: 3.8 G/DL (ref 3.5–5.2)
ALP SERPL-CCNC: 56 U/L (ref 55–135)
ALT SERPL W/O P-5'-P-CCNC: 14 U/L (ref 10–44)
AMPHET+METHAMPHET UR QL: NEGATIVE
ANION GAP SERPL CALC-SCNC: 9 MMOL/L (ref 8–16)
APAP SERPL-MCNC: <10 UG/ML (ref 10–20)
AST SERPL-CCNC: 16 U/L (ref 10–40)
B-HCG UR QL: NEGATIVE
BARBITURATES UR QL SCN>200 NG/ML: NEGATIVE
BASOPHILS # BLD AUTO: 0.09 K/UL (ref 0–0.2)
BASOPHILS NFR BLD: 0.7 % (ref 0–1.9)
BENZODIAZ UR QL SCN>200 NG/ML: NEGATIVE
BILIRUB SERPL-MCNC: 0.4 MG/DL (ref 0.1–1)
BILIRUB UR QL STRIP: NEGATIVE
BUN SERPL-MCNC: 12 MG/DL (ref 6–20)
BZE UR QL SCN: NEGATIVE
CALCIUM SERPL-MCNC: 8.5 MG/DL (ref 8.7–10.5)
CANNABINOIDS UR QL SCN: NORMAL
CHLORIDE SERPL-SCNC: 101 MMOL/L (ref 95–110)
CLARITY UR: ABNORMAL
CO2 SERPL-SCNC: 25 MMOL/L (ref 23–29)
COLOR UR: YELLOW
CREAT SERPL-MCNC: 0.8 MG/DL (ref 0.5–1.4)
CREAT UR-MCNC: 151.5 MG/DL (ref 15–325)
DIFFERENTIAL METHOD: ABNORMAL
EOSINOPHIL # BLD AUTO: 0.3 K/UL (ref 0–0.5)
EOSINOPHIL NFR BLD: 2.6 % (ref 0–8)
ERYTHROCYTE [DISTWIDTH] IN BLOOD BY AUTOMATED COUNT: 22.7 % (ref 11.5–14.5)
EST. GFR  (AFRICAN AMERICAN): >60 ML/MIN/1.73 M^2
EST. GFR  (NON AFRICAN AMERICAN): >60 ML/MIN/1.73 M^2
ETHANOL SERPL-MCNC: <5 MG/DL
GLUCOSE SERPL-MCNC: 77 MG/DL (ref 70–110)
GLUCOSE UR QL STRIP: NEGATIVE
HCT VFR BLD AUTO: 33.7 % (ref 37–48.5)
HGB BLD-MCNC: 10.3 G/DL (ref 12–16)
HGB UR QL STRIP: NEGATIVE
IMM GRANULOCYTES # BLD AUTO: 0.04 K/UL (ref 0–0.04)
IMM GRANULOCYTES NFR BLD AUTO: 0.3 % (ref 0–0.5)
KETONES UR QL STRIP: ABNORMAL
LEUKOCYTE ESTERASE UR QL STRIP: NEGATIVE
LYMPHOCYTES # BLD AUTO: 3.3 K/UL (ref 1–4.8)
LYMPHOCYTES NFR BLD: 27 % (ref 18–48)
MCH RBC QN AUTO: 24.2 PG (ref 27–31)
MCHC RBC AUTO-ENTMCNC: 30.6 G/DL (ref 32–36)
MCV RBC AUTO: 79 FL (ref 82–98)
MONOCYTES # BLD AUTO: 1.1 K/UL (ref 0.3–1)
MONOCYTES NFR BLD: 9 % (ref 4–15)
NEUTROPHILS # BLD AUTO: 7.4 K/UL (ref 1.8–7.7)
NEUTROPHILS NFR BLD: 60.4 % (ref 38–73)
NITRITE UR QL STRIP: NEGATIVE
NRBC BLD-RTO: 0 /100 WBC
OPIATES UR QL SCN: NEGATIVE
PCP UR QL SCN>25 NG/ML: NEGATIVE
PH UR STRIP: 6 [PH] (ref 5–8)
PLATELET # BLD AUTO: 386 K/UL (ref 150–350)
PMV BLD AUTO: 9.4 FL (ref 9.2–12.9)
POTASSIUM SERPL-SCNC: 3.2 MMOL/L (ref 3.5–5.1)
PROT SERPL-MCNC: 7.4 G/DL (ref 6–8.4)
PROT UR QL STRIP: NEGATIVE
RBC # BLD AUTO: 4.25 M/UL (ref 4–5.4)
SALICYLATES SERPL-MCNC: <4 MG/DL (ref 15–30)
SODIUM SERPL-SCNC: 135 MMOL/L (ref 136–145)
SP GR UR STRIP: 1.02 (ref 1–1.03)
TOXICOLOGY INFORMATION: NORMAL
TSH SERPL DL<=0.005 MIU/L-ACNC: 2.98 UIU/ML (ref 0.34–5.6)
URN SPEC COLLECT METH UR: ABNORMAL
UROBILINOGEN UR STRIP-ACNC: NEGATIVE EU/DL
WBC # BLD AUTO: 12.3 K/UL (ref 3.9–12.7)

## 2020-01-19 PROCEDURE — 25000003 PHARM REV CODE 250: Performed by: NURSE PRACTITIONER

## 2020-01-19 PROCEDURE — 81025 URINE PREGNANCY TEST: CPT

## 2020-01-19 PROCEDURE — 80307 DRUG TEST PRSMV CHEM ANLYZR: CPT

## 2020-01-19 PROCEDURE — 63600175 PHARM REV CODE 636 W HCPCS: Performed by: EMERGENCY MEDICINE

## 2020-01-19 PROCEDURE — 85025 COMPLETE CBC W/AUTO DIFF WBC: CPT

## 2020-01-19 PROCEDURE — 80053 COMPREHEN METABOLIC PANEL: CPT

## 2020-01-19 PROCEDURE — 80320 DRUG SCREEN QUANTALCOHOLS: CPT

## 2020-01-19 PROCEDURE — 63600175 PHARM REV CODE 636 W HCPCS: Performed by: NURSE PRACTITIONER

## 2020-01-19 PROCEDURE — 25000003 PHARM REV CODE 250: Performed by: EMERGENCY MEDICINE

## 2020-01-19 PROCEDURE — 84443 ASSAY THYROID STIM HORMONE: CPT

## 2020-01-19 PROCEDURE — 96372 THER/PROPH/DIAG INJ SC/IM: CPT

## 2020-01-19 PROCEDURE — 36415 COLL VENOUS BLD VENIPUNCTURE: CPT

## 2020-01-19 PROCEDURE — 80329 ANALGESICS NON-OPIOID 1 OR 2: CPT

## 2020-01-19 PROCEDURE — 99285 EMERGENCY DEPT VISIT HI MDM: CPT | Mod: 25

## 2020-01-19 PROCEDURE — 81003 URINALYSIS AUTO W/O SCOPE: CPT

## 2020-01-19 RX ORDER — KETOROLAC TROMETHAMINE 30 MG/ML
30 INJECTION, SOLUTION INTRAMUSCULAR; INTRAVENOUS
Status: COMPLETED | OUTPATIENT
Start: 2020-01-19 | End: 2020-01-19

## 2020-01-19 RX ORDER — LORAZEPAM 2 MG/ML
2 INJECTION INTRAMUSCULAR
Status: COMPLETED | OUTPATIENT
Start: 2020-01-19 | End: 2020-01-19

## 2020-01-19 RX ORDER — DIPHENHYDRAMINE HYDROCHLORIDE 50 MG/ML
50 INJECTION INTRAMUSCULAR; INTRAVENOUS
Status: COMPLETED | OUTPATIENT
Start: 2020-01-19 | End: 2020-01-19

## 2020-01-19 RX ORDER — POTASSIUM CHLORIDE 20 MEQ/1
40 TABLET, EXTENDED RELEASE ORAL
Status: COMPLETED | OUTPATIENT
Start: 2020-01-19 | End: 2020-01-19

## 2020-01-19 RX ORDER — HYDROXYZINE PAMOATE 25 MG/1
25 CAPSULE ORAL
Status: COMPLETED | OUTPATIENT
Start: 2020-01-19 | End: 2020-01-19

## 2020-01-19 RX ORDER — HALOPERIDOL 5 MG/ML
5 INJECTION INTRAMUSCULAR
Status: COMPLETED | OUTPATIENT
Start: 2020-01-19 | End: 2020-01-19

## 2020-01-19 RX ADMIN — LORAZEPAM 2 MG: 2 INJECTION INTRAMUSCULAR; INTRAVENOUS at 01:01

## 2020-01-19 RX ADMIN — HYDROXYZINE PAMOATE 25 MG: 25 CAPSULE ORAL at 11:01

## 2020-01-19 RX ADMIN — POTASSIUM CHLORIDE 40 MEQ: 1500 TABLET, EXTENDED RELEASE ORAL at 03:01

## 2020-01-19 RX ADMIN — KETOROLAC TROMETHAMINE 30 MG: 30 INJECTION, SOLUTION INTRAMUSCULAR at 11:01

## 2020-01-19 RX ADMIN — HALOPERIDOL LACTATE 5 MG: 5 INJECTION, SOLUTION INTRAMUSCULAR at 01:01

## 2020-01-19 RX ADMIN — DIPHENHYDRAMINE HYDROCHLORIDE 50 MG: 50 INJECTION INTRAMUSCULAR; INTRAVENOUS at 01:01

## 2020-01-19 NOTE — ED NOTES
Called CPT spoke with Josh to inform her that patient has been accepted to [facility] in [place] but not until after 7pm.

## 2020-01-19 NOTE — ED NOTES
"Patient talking to someone that is not really there. States "They are in these fucking walls. I don't want them anywhere around me. They are going to think I am the police. I will put that bitch in a coma and she will wish she was dead. Try me."    Successfully de-escalated patient. Dr. Moeller notified. New orders being placed for medication.   Sebastian Diaz, Primary RN notified.   "

## 2020-01-19 NOTE — ED PROVIDER NOTES
Encounter Date: 1/19/2020       History   No chief complaint on file.    Ambulatory to ED with complaints of lower back pain x 3 days.          Review of patient's allergies indicates:   Allergen Reactions    Codeine      Past Medical History:   Diagnosis Date    Bipolar affective disorder, manic, severe, with psychotic behavior 8/14/2014    Depression     Fatigue     History of psychiatric care     History of psychiatric hospitalization     Psychiatric problem     Psychosis 5/13/2015    Schizoaffective disorder      Past Surgical History:   Procedure Laterality Date    ANKLE SURGERY Right      Family History   Problem Relation Age of Onset    No Known Problems Mother     No Known Problems Father     No Known Problems Sister     No Known Problems Brother     No Known Problems Maternal Aunt     No Known Problems Paternal Aunt     No Known Problems Maternal Uncle     No Known Problems Paternal Uncle     No Known Problems Maternal Grandfather     No Known Problems Maternal Grandmother     No Known Problems Paternal Grandfather     No Known Problems Paternal Grandmother     No Known Problems Cousin     ADD / ADHD Neg Hx     Alcohol abuse Neg Hx     Anxiety disorder Neg Hx     Bipolar disorder Neg Hx     Dementia Neg Hx     Depression Neg Hx     Drug abuse Neg Hx     OCD Neg Hx     Paranoid behavior Neg Hx     Physical abuse Neg Hx     Schizophrenia Neg Hx     Seizures Neg Hx     Self injury Neg Hx     Sexual abuse Neg Hx     Suicide Neg Hx      Social History     Tobacco Use    Smoking status: Current Every Day Smoker     Packs/day: 0.50     Years: 34.00     Pack years: 17.00     Types: Cigarettes    Smokeless tobacco: Never Used   Substance Use Topics    Alcohol use: No    Drug use: Yes     Types: Marijuana     Review of Systems   Constitutional: Negative.    HENT: Negative.    Eyes: Negative.    Respiratory: Negative.    Cardiovascular: Negative.    Gastrointestinal: Negative.     Endocrine: Negative.    Genitourinary: Negative.    Musculoskeletal: Positive for back pain.        Reports L lower back pain without known mechanism of trauma   Skin: Negative.    Allergic/Immunologic: Negative.    Neurological: Negative.    Hematological: Negative.    Psychiatric/Behavioral: Positive for agitation. The patient is nervous/anxious and is hyperactive.        Physical Exam     Initial Vitals   BP Pulse Resp Temp SpO2   -- -- -- -- --      MAP       --         Physical Exam    Nursing note and vitals reviewed.  Constitutional: She appears well-developed and well-nourished.   HENT:   Head: Normocephalic and atraumatic.   Right Ear: External ear normal.   Left Ear: External ear normal.   Nose: Nose normal.   Mouth/Throat: Oropharynx is clear and moist.   Eyes: Conjunctivae and EOM are normal. Pupils are equal, round, and reactive to light.   Neck: Normal range of motion. Neck supple.   Cardiovascular: Normal rate, regular rhythm, normal heart sounds and intact distal pulses.   Pulmonary/Chest: Breath sounds normal.   Abdominal: Soft. Bowel sounds are normal.   Musculoskeletal: Normal range of motion.   Neurological: She is alert and oriented to person, place, and time. She has normal strength and normal reflexes.   Skin: Capillary refill takes 2 to 3 seconds.   Psychiatric: She has a normal mood and affect. Her behavior is normal. Judgment and thought content normal.   During reexamination the patient was noted to be actively hallucinating and talking to the walls which she states were being manipulated by her antagonists which she cannot identify.           ED Course   Procedures  Labs Reviewed - No data to display       Imaging Results    None          Medical Decision Making:   Initial Assessment:   This anxious lady presents with complaints of lower back pain which she believes may be caused by stress of a socially stressful living situation.  She denies feeling suicidal or homicidal but seems  fixated on the behavior of her neighbors.  She denies hallucinations of any type, her med history includes Risperdone and tegretol which she takes for mood disorder.  She admits she has been off her medicine for 3 months.    Differential Diagnosis:   Back pain, anxiety,   ED Management:  During reexamination the patient was noted to be actively hallucinating and talking to the walls which she states were being manipulated by her antagonists which she cannot identify.  She was moved to ED room 9, Dr Moeller notified of psychotic decompensation.                                   Clinical Impression:       ICD-10-CM ICD-9-CM   1. Back pain, unspecified back location, unspecified back pain laterality, unspecified chronicity M54.9 724.5                             Donaldo Gao NP  01/19/20 1144       Donaldo Gao NP  01/19/20 1220

## 2020-01-19 NOTE — ED NOTES
Patient appears restless. Lying in bed with eyes closed. Observed rise and fall of chest. No need or concerns at this time. Will continue to monitor.

## 2020-01-19 NOTE — ED NOTES
"Patient states "If I get my hands on him, he is going to custodial or I am going to custodial."   Patient continuously laughing and making faces as she responds to auditory hallucinations.   "

## 2020-01-19 NOTE — ED PROVIDER NOTES
Encounter Date: 1/19/2020       History     Chief Complaint   Patient presents with    Back Pain     Patient complaining of lower back pain from stress.     59-year-old female with past medical history of bipolar affective disorder, depression, schizoaffective disorder presents to the ED as a walk-in for back pain. However upon interview and examination the patient, she is having active auditory and visual hallucinations instructing her to kill the demons.  States these demons are telling her to kill people, and while she knows this is wrong, she feels compelled to do so.  History is limited due to psychiatric illness.        Review of patient's allergies indicates:   Allergen Reactions    Codeine      Past Medical History:   Diagnosis Date    Bipolar affective disorder, manic, severe, with psychotic behavior 8/14/2014    Depression     Fatigue     History of psychiatric care     History of psychiatric hospitalization     Psychiatric problem     Psychosis 5/13/2015    Schizoaffective disorder      Past Surgical History:   Procedure Laterality Date    ANKLE SURGERY Right      Family History   Problem Relation Age of Onset    No Known Problems Mother     No Known Problems Father     No Known Problems Sister     No Known Problems Brother     No Known Problems Maternal Aunt     No Known Problems Paternal Aunt     No Known Problems Maternal Uncle     No Known Problems Paternal Uncle     No Known Problems Maternal Grandfather     No Known Problems Maternal Grandmother     No Known Problems Paternal Grandfather     No Known Problems Paternal Grandmother     No Known Problems Cousin     ADD / ADHD Neg Hx     Alcohol abuse Neg Hx     Anxiety disorder Neg Hx     Bipolar disorder Neg Hx     Dementia Neg Hx     Depression Neg Hx     Drug abuse Neg Hx     OCD Neg Hx     Paranoid behavior Neg Hx     Physical abuse Neg Hx     Schizophrenia Neg Hx     Seizures Neg Hx     Self injury Neg Hx      Sexual abuse Neg Hx     Suicide Neg Hx      Social History     Tobacco Use    Smoking status: Current Every Day Smoker     Packs/day: 0.50     Years: 34.00     Pack years: 17.00     Types: Cigarettes    Smokeless tobacco: Never Used   Substance Use Topics    Alcohol use: No    Drug use: Yes     Types: Marijuana     Review of Systems   Unable to perform ROS: Psychiatric disorder       Physical Exam     Initial Vitals [01/19/20 1123]   BP Pulse Resp Temp SpO2   (!) 156/92 98 20 98.6 °F (37 °C) 98 %      MAP       --         Physical Exam    Nursing note and vitals reviewed.  Constitutional: She appears well-developed and well-nourished. She is not diaphoretic. No distress.   HENT:   Head: Normocephalic and atraumatic.   Right Ear: External ear normal.   Left Ear: External ear normal.   Nose: Nose normal.   Mouth/Throat: Oropharynx is clear and moist.   Eyes: Conjunctivae are normal. Pupils are equal, round, and reactive to light. No scleral icterus.   Neck: Normal range of motion. Neck supple. No JVD present.   Cardiovascular: Normal rate, regular rhythm, normal heart sounds and intact distal pulses.   Pulmonary/Chest: Breath sounds normal. No respiratory distress. She has no wheezes. She has no rhonchi. She has no rales. She exhibits no tenderness.   Abdominal: Soft. Bowel sounds are normal. She exhibits no distension. There is no tenderness. There is no rebound and no guarding.   Musculoskeletal: Normal range of motion. She exhibits no edema or tenderness.   Lymphadenopathy:     She has no cervical adenopathy.   Neurological: She is alert and oriented to person, place, and time. GCS score is 15. GCS eye subscore is 4. GCS verbal subscore is 5. GCS motor subscore is 6.   Skin: Skin is warm and dry. Capillary refill takes less than 2 seconds. No rash noted. No erythema. No pallor.   Psychiatric: Her affect is labile. Her speech is rapid and/or pressured and tangential. She is agitated and hyperactive. Thought  content is paranoid and delusional. Cognition and memory are impaired. She expresses impulsivity and inappropriate judgment. She expresses homicidal ideation. She expresses no suicidal ideation. She expresses no suicidal plans and no homicidal plans.         ED Course   Procedures  Labs Reviewed   CBC W/ AUTO DIFFERENTIAL - Abnormal; Notable for the following components:       Result Value    Hemoglobin 10.3 (*)     Hematocrit 33.7 (*)     Mean Corpuscular Volume 79 (*)     Mean Corpuscular Hemoglobin 24.2 (*)     Mean Corpuscular Hemoglobin Conc 30.6 (*)     RDW 22.7 (*)     Platelets 386 (*)     Mono # 1.1 (*)     All other components within normal limits   COMPREHENSIVE METABOLIC PANEL - Abnormal; Notable for the following components:    Sodium 135 (*)     Potassium 3.2 (*)     Calcium 8.5 (*)     All other components within normal limits   URINALYSIS, REFLEX TO URINE CULTURE - Abnormal; Notable for the following components:    Appearance, UA Hazy (*)     Ketones, UA Trace (*)     All other components within normal limits    Narrative:     Preferred Collection Type->Urine, Clean Catch   SALICYLATE LEVEL - Abnormal; Notable for the following components:    Salicylate Lvl <4.0 (*)     All other components within normal limits   TSH   DRUG SCREEN PANEL, URINE EMERGENCY    Narrative:     Preferred Collection Type->Urine, Clean Catch   ALCOHOL,MEDICAL (ETHANOL)   ACETAMINOPHEN LEVEL   PREGNANCY TEST, URINE RAPID          Imaging Results    None          Medical Decision Making:   Differential Diagnosis:   Acute psychosis, polysubstance abuse, schizoaffective disorder, auditory hallucinations, visual hallucinations, homicidal ideations  ED Management:  With active homicidal ideations this patient has been deemed a danger to both herself and others at this time.  72 hr hold has been placed.    Behavior escalated shortly after arrival requiring intervention with medication to protect staff and patient from herself.     Patient is now medically cleared for transfer to inpatient psychiatric facility.                                 Clinical Impression:       ICD-10-CM ICD-9-CM   1. Back pain, unspecified back location, unspecified back pain laterality, unspecified chronicity M54.9 724.5   2. Acute psychosis F23 298.9   3. Homicidal thoughts R45.850 V62.85   4. Severe auditory hallucinations R44.0 780.1   5. Continuous visual hallucinations R44.1 368.16   6. Paranoid schizophrenia F20.0 295.30         Disposition:   Disposition: Transferred  Condition: Stable                     Valeria Moeller MD  01/19/20 0006

## 2020-01-19 NOTE — ED NOTES
"Blanca from [facility] called with accepting the patient to the service of Dr. Mathew. States "We are going to accept the patient, but the bed is occupied until 7pm. You will not be able to send her until after 7pm. Her potassium needs to be corrected as well."   Informed Blanca patient was being medicated for potassium momentarily.     Patient accepted to [facility] after 7pm by Dr. Mathew.   Patient assigned to Room 105, bed #1.  Number to call report [phone].  "

## 2020-01-19 NOTE — ED NOTES
"Chichi Donnelly RN from [facility] called. States "The patient needs to be medicated for her potassium and then have her lab repeated. That is what the doctor is saying."   April also inquired about patient's history. Informed April that patient came in having auditory hallucinations. Patient stated to the physician "I know I can't kill anyone but the voices are telling me to kill people." Patient also reported she has been off of her medications for a while.     Dr. Moeller notified.   "

## 2020-01-19 NOTE — ED NOTES
Updated patient on plan of care.   Informed Dr. Moeller has been accepted to [facility] but not until after 7pm.

## 2020-01-20 NOTE — ED NOTES
[facility] arrives to transport patient. Patient verifies that she has one bag of clothing and a book bag, these belongings are given to EMS.

## 2020-05-11 ENCOUNTER — HOSPITAL ENCOUNTER (EMERGENCY)
Facility: HOSPITAL | Age: 60
Discharge: HOME | End: 2020-05-11
Attending: FAMILY MEDICINE
Payer: MEDICAID

## 2020-05-11 VITALS
HEART RATE: 112 BPM | DIASTOLIC BLOOD PRESSURE: 91 MMHG | WEIGHT: 137 LBS | HEIGHT: 66 IN | TEMPERATURE: 99 F | BODY MASS INDEX: 22.02 KG/M2 | SYSTOLIC BLOOD PRESSURE: 124 MMHG | OXYGEN SATURATION: 97 % | RESPIRATION RATE: 16 BRPM

## 2020-05-11 DIAGNOSIS — F41.9 ANXIETY: Primary | ICD-10-CM

## 2020-05-11 PROCEDURE — 99283 EMERGENCY DEPT VISIT LOW MDM: CPT

## 2020-05-11 PROCEDURE — 25000003 PHARM REV CODE 250: Performed by: FAMILY MEDICINE

## 2020-05-11 RX ORDER — HYDROXYZINE PAMOATE 50 MG/1
50 CAPSULE ORAL EVERY 8 HOURS PRN
Qty: 30 CAPSULE | Refills: 1 | Status: SHIPPED | OUTPATIENT
Start: 2020-05-11 | End: 2020-05-27 | Stop reason: CLARIF

## 2020-05-11 RX ORDER — HYDROXYZINE PAMOATE 25 MG/1
50 CAPSULE ORAL
Status: COMPLETED | OUTPATIENT
Start: 2020-05-11 | End: 2020-05-11

## 2020-05-11 RX ORDER — HYDROXYZINE PAMOATE 50 MG/1
50 CAPSULE ORAL EVERY 8 HOURS PRN
Qty: 30 CAPSULE | Refills: 1 | Status: SHIPPED | OUTPATIENT
Start: 2020-05-11 | End: 2020-05-11 | Stop reason: SDUPTHER

## 2020-05-11 RX ADMIN — HYDROXYZINE PAMOATE 50 MG: 25 CAPSULE ORAL at 11:05
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
## 2020-05-27 ENCOUNTER — HOSPITAL ENCOUNTER (EMERGENCY)
Facility: HOSPITAL | Age: 60
Discharge: HOME | End: 2020-05-28
Attending: FAMILY MEDICINE
Payer: MEDICAID

## 2020-05-27 DIAGNOSIS — F20.9 SCHIZOPHRENIA, UNSPECIFIED TYPE: ICD-10-CM

## 2020-05-27 DIAGNOSIS — F22 DELUSIONS: ICD-10-CM

## 2020-05-27 DIAGNOSIS — F41.9 ANXIETY: Primary | ICD-10-CM

## 2020-05-27 DIAGNOSIS — F22 PARANOIA: ICD-10-CM

## 2020-05-27 LAB
ALBUMIN SERPL BCP-MCNC: 4.3 G/DL (ref 3.5–5.2)
ALP SERPL-CCNC: 68 U/L (ref 55–135)
ALT SERPL W/O P-5'-P-CCNC: 12 U/L (ref 10–44)
AMPHET+METHAMPHET UR QL: NEGATIVE
ANION GAP SERPL CALC-SCNC: 12 MMOL/L (ref 8–16)
APAP SERPL-MCNC: <10 UG/ML (ref 10–20)
AST SERPL-CCNC: 17 U/L (ref 10–40)
B-HCG UR QL: NEGATIVE
BARBITURATES UR QL SCN>200 NG/ML: NEGATIVE
BASOPHILS # BLD AUTO: 0.08 K/UL (ref 0–0.2)
BASOPHILS NFR BLD: 0.6 % (ref 0–1.9)
BENZODIAZ UR QL SCN>200 NG/ML: NEGATIVE
BILIRUB SERPL-MCNC: 0.9 MG/DL (ref 0.1–1)
BILIRUB UR QL STRIP: ABNORMAL
BUN SERPL-MCNC: 13 MG/DL (ref 6–20)
BZE UR QL SCN: NEGATIVE
CALCIUM SERPL-MCNC: 9.2 MG/DL (ref 8.7–10.5)
CANNABINOIDS UR QL SCN: NORMAL
CHLORIDE SERPL-SCNC: 101 MMOL/L (ref 95–110)
CLARITY UR: CLEAR
CO2 SERPL-SCNC: 24 MMOL/L (ref 23–29)
COLOR UR: YELLOW
CREAT SERPL-MCNC: 1 MG/DL (ref 0.5–1.4)
CREAT UR-MCNC: 205.7 MG/DL (ref 15–325)
DIFFERENTIAL METHOD: ABNORMAL
EOSINOPHIL # BLD AUTO: 0.2 K/UL (ref 0–0.5)
EOSINOPHIL NFR BLD: 1.8 % (ref 0–8)
ERYTHROCYTE [DISTWIDTH] IN BLOOD BY AUTOMATED COUNT: 24.1 % (ref 11.5–14.5)
EST. GFR  (AFRICAN AMERICAN): >60 ML/MIN/1.73 M^2
EST. GFR  (NON AFRICAN AMERICAN): >60 ML/MIN/1.73 M^2
ETHANOL SERPL-MCNC: <5 MG/DL
GLUCOSE SERPL-MCNC: 100 MG/DL (ref 70–110)
GLUCOSE UR QL STRIP: NEGATIVE
HCT VFR BLD AUTO: 36.5 % (ref 37–48.5)
HGB BLD-MCNC: 10.9 G/DL (ref 12–16)
HGB UR QL STRIP: NEGATIVE
IMM GRANULOCYTES # BLD AUTO: 0.05 K/UL (ref 0–0.04)
IMM GRANULOCYTES NFR BLD AUTO: 0.4 % (ref 0–0.5)
KETONES UR QL STRIP: ABNORMAL
LEUKOCYTE ESTERASE UR QL STRIP: NEGATIVE
LYMPHOCYTES # BLD AUTO: 2.9 K/UL (ref 1–4.8)
LYMPHOCYTES NFR BLD: 22.1 % (ref 18–48)
MAGNESIUM SERPL-MCNC: 2.1 MG/DL (ref 1.6–2.6)
MCH RBC QN AUTO: 22.4 PG (ref 27–31)
MCHC RBC AUTO-ENTMCNC: 29.9 G/DL (ref 32–36)
MCV RBC AUTO: 75 FL (ref 82–98)
MONOCYTES # BLD AUTO: 0.9 K/UL (ref 0.3–1)
MONOCYTES NFR BLD: 6.6 % (ref 4–15)
NEUTROPHILS # BLD AUTO: 9 K/UL (ref 1.8–7.7)
NEUTROPHILS NFR BLD: 68.5 % (ref 38–73)
NITRITE UR QL STRIP: NEGATIVE
NRBC BLD-RTO: 0 /100 WBC
OPIATES UR QL SCN: NEGATIVE
PCP UR QL SCN>25 NG/ML: NEGATIVE
PH UR STRIP: 5 [PH] (ref 5–8)
PLATELET # BLD AUTO: 422 K/UL (ref 150–350)
PMV BLD AUTO: 9.4 FL (ref 9.2–12.9)
POTASSIUM SERPL-SCNC: 3.8 MMOL/L (ref 3.5–5.1)
PROT SERPL-MCNC: 8.2 G/DL (ref 6–8.4)
PROT UR QL STRIP: NEGATIVE
RBC # BLD AUTO: 4.87 M/UL (ref 4–5.4)
SALICYLATES SERPL-MCNC: <4 MG/DL (ref 15–30)
SARS-COV-2 RDRP RESP QL NAA+PROBE: NEGATIVE
SODIUM SERPL-SCNC: 137 MMOL/L (ref 136–145)
SP GR UR STRIP: 1.02 (ref 1–1.03)
TOXICOLOGY INFORMATION: NORMAL
TSH SERPL DL<=0.005 MIU/L-ACNC: 2.07 UIU/ML (ref 0.34–5.6)
URN SPEC COLLECT METH UR: ABNORMAL
UROBILINOGEN UR STRIP-ACNC: NEGATIVE EU/DL
WBC # BLD AUTO: 13.12 K/UL (ref 3.9–12.7)

## 2020-05-27 PROCEDURE — 63600175 PHARM REV CODE 636 W HCPCS

## 2020-05-27 PROCEDURE — 25000003 PHARM REV CODE 250: Performed by: NURSE PRACTITIONER

## 2020-05-27 PROCEDURE — 80307 DRUG TEST PRSMV CHEM ANLYZR: CPT

## 2020-05-27 PROCEDURE — 81025 URINE PREGNANCY TEST: CPT

## 2020-05-27 PROCEDURE — U0002 COVID-19 LAB TEST NON-CDC: HCPCS

## 2020-05-27 PROCEDURE — 84443 ASSAY THYROID STIM HORMONE: CPT

## 2020-05-27 PROCEDURE — 80329 ANALGESICS NON-OPIOID 1 OR 2: CPT

## 2020-05-27 PROCEDURE — 99285 EMERGENCY DEPT VISIT HI MDM: CPT

## 2020-05-27 PROCEDURE — 81003 URINALYSIS AUTO W/O SCOPE: CPT | Mod: 59

## 2020-05-27 PROCEDURE — 80320 DRUG SCREEN QUANTALCOHOLS: CPT

## 2020-05-27 PROCEDURE — 85025 COMPLETE CBC W/AUTO DIFF WBC: CPT

## 2020-05-27 PROCEDURE — 80053 COMPREHEN METABOLIC PANEL: CPT

## 2020-05-27 PROCEDURE — 83735 ASSAY OF MAGNESIUM: CPT

## 2020-05-27 RX ORDER — HYDROXYZINE HYDROCHLORIDE 25 MG/1
25 TABLET, FILM COATED ORAL 3 TIMES DAILY PRN
Qty: 20 TABLET | Refills: 0 | Status: SHIPPED | OUTPATIENT
Start: 2020-05-27 | End: 2020-08-14 | Stop reason: CLARIF

## 2020-05-27 RX ORDER — DIPHENHYDRAMINE HCL 25 MG
50 CAPSULE ORAL
Status: COMPLETED | OUTPATIENT
Start: 2020-05-27 | End: 2020-05-27

## 2020-05-27 RX ORDER — ZIPRASIDONE MESYLATE 20 MG/ML
INJECTION, POWDER, LYOPHILIZED, FOR SOLUTION INTRAMUSCULAR
Status: COMPLETED
Start: 2020-05-27 | End: 2020-05-27

## 2020-05-27 RX ADMIN — DIPHENHYDRAMINE HYDROCHLORIDE 50 MG: 25 CAPSULE ORAL at 07:05

## 2020-05-27 RX ADMIN — ZIPRASIDONE MESYLATE: 20 INJECTION, POWDER, LYOPHILIZED, FOR SOLUTION INTRAMUSCULAR at 07:05
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
## 2020-05-27 NOTE — DISCHARGE INSTRUCTIONS
Take all medications as prescribed.  Follow-up with your primary care provider as discussed.  Please remember that you had a visit to the emergency room today and this does not substitute as primary care services for ongoing management because emergency services is a snap shot in time.  Should you have any worsening condition that requires emergency services do not hesitate to return to the ER.    COVID-19 TESTING  IF YOU DESIRE TO BE TESTED, CALL TO SCHEDULE AN APPOINTMENT:  Hot Line [phone]  [address] [place] [address], [place] [address]  [facility]  Hours 8am-5pm Monday Through Friday

## 2020-05-28 VITALS
HEIGHT: 66 IN | BODY MASS INDEX: 22.02 KG/M2 | WEIGHT: 137 LBS | SYSTOLIC BLOOD PRESSURE: 97 MMHG | HEART RATE: 86 BPM | RESPIRATION RATE: 16 BRPM | DIASTOLIC BLOOD PRESSURE: 70 MMHG | TEMPERATURE: 98 F | OXYGEN SATURATION: 98 %

## 2020-05-28 NOTE — ED NOTES
Patient leaves via [facility] via stretcher. Respirations even and unlabored.     RN calls Andie Tom to call report, spoke with Vincenzo RN. Patient records, 72 hour hold, transport forms sent with [facility].
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
## 2020-05-28 NOTE — PROVIDER PROGRESS NOTES - EMERGENCY DEPT.
Encounter Date: 5/27/2020    ED Physician Progress Notes        Physician Note:   Patient required psychiatric evaluation due to delusions.  Her anxiety and schizophrenia her baseline.  She now has delusions of police officers trying to interfering complicated her life.  Patient currently denies any suicidal ideations the stated she wanted to kill the police officers.  She does not specify who she wants to kill just in general the officers that are messing with her.  She then denies any homicidal ideations directly after saying that.  She denies any actual hallucinations.  Initial note stated that patient would be discharged home for midlevel.  This is changed.  I am unable to change the existing note at this time.  Addendum was added to the note but disposition will be to psychiatric facility.

## 2020-05-28 NOTE — ED NOTES
RN calls Afshan from [facility], receives acceptance information. Dr. Mas accepts. 2051. Number for report is [phone].    RN calls [facility] to inform them of acceptance information, spoke with Emelyn. Emelyn to arrange transport.
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
## 2020-08-14 ENCOUNTER — HOSPITAL ENCOUNTER (EMERGENCY)
Facility: HOSPITAL | Age: 60
Discharge: HOME | End: 2020-08-14
Attending: EMERGENCY MEDICINE
Payer: MEDICAID

## 2020-08-14 VITALS
HEIGHT: 66 IN | OXYGEN SATURATION: 95 % | HEART RATE: 117 BPM | RESPIRATION RATE: 18 BRPM | BODY MASS INDEX: 21.86 KG/M2 | DIASTOLIC BLOOD PRESSURE: 105 MMHG | WEIGHT: 136 LBS | SYSTOLIC BLOOD PRESSURE: 140 MMHG | TEMPERATURE: 99 F

## 2020-08-14 DIAGNOSIS — M62.830 SPASM OF THORACIC BACK MUSCLE: ICD-10-CM

## 2020-08-14 DIAGNOSIS — M54.6 ACUTE LEFT-SIDED THORACIC BACK PAIN: Primary | ICD-10-CM

## 2020-08-14 PROCEDURE — 99284 EMERGENCY DEPT VISIT MOD MDM: CPT

## 2020-08-14 RX ORDER — METHOCARBAMOL 500 MG/1
500 TABLET, FILM COATED ORAL 3 TIMES DAILY
Qty: 15 TABLET | Refills: 0 | Status: SHIPPED | OUTPATIENT
Start: 2020-08-14 | End: 2020-08-19

## 2020-08-14 RX ORDER — KETOROLAC TROMETHAMINE 10 MG/1
10 TABLET, FILM COATED ORAL EVERY 6 HOURS PRN
Qty: 20 TABLET | Refills: 0 | Status: SHIPPED | OUTPATIENT
Start: 2020-08-14 | End: 2020-09-10 | Stop reason: CLARIF

## 2020-08-14 NOTE — ED PROVIDER NOTES
"Encounter Date: 8/14/2020       History     Chief Complaint   Patient presents with    Back Pain     60yo female w/ c/o atraumatic left upper back pain for the past few days; patient states that her "back is opening and closing" admitting that she is not currently on her antipsychotic medications, patient is anxious but is cooperative, she denies suicidal/homicidal ideations    Denies headache, dizziness, syncope, vision changes, neck pain, previous injury or surgery    No previous evaluation has been performed nor has PCP been contacted for today's concerns    Past medical/surgical history, allergies & current medications reviewed with patient -- postmenopausal, psych history but not currently on medications citing that they do not work    Known SARS-CoV2 exposure:  No  Smokes:  Yes -- tobacco      The history is provided by the patient. No  was used.     Review of patient's allergies indicates:   Allergen Reactions    Codeine      Past Medical History:   Diagnosis Date    Bipolar affective disorder, manic, severe, with psychotic behavior 8/14/2014    Depression     Fatigue     History of psychiatric care     History of psychiatric hospitalization     Psychiatric problem     Psychosis 5/13/2015    Schizoaffective disorder     Schizophrenia      Past Surgical History:   Procedure Laterality Date    ANKLE SURGERY Right      Family History   Problem Relation Age of Onset    No Known Problems Mother     No Known Problems Father     No Known Problems Sister     No Known Problems Brother     No Known Problems Maternal Aunt     No Known Problems Paternal Aunt     No Known Problems Maternal Uncle     No Known Problems Paternal Uncle     No Known Problems Maternal Grandfather     No Known Problems Maternal Grandmother     No Known Problems Paternal Grandfather     No Known Problems Paternal Grandmother     No Known Problems Cousin     ADD / ADHD Neg Hx     Alcohol abuse Neg Hx  "    Anxiety disorder Neg Hx     Bipolar disorder Neg Hx     Dementia Neg Hx     Depression Neg Hx     Drug abuse Neg Hx     OCD Neg Hx     Paranoid behavior Neg Hx     Physical abuse Neg Hx     Schizophrenia Neg Hx     Seizures Neg Hx     Self injury Neg Hx     Sexual abuse Neg Hx     Suicide Neg Hx      Social History     Tobacco Use    Smoking status: Current Every Day Smoker     Packs/day: 0.50     Years: 34.00     Pack years: 17.00     Types: Cigarettes    Smokeless tobacco: Never Used   Substance Use Topics    Alcohol use: No    Drug use: Yes     Types: Marijuana     Review of Systems   Constitutional: Negative for fever.   HENT: Negative for sore throat.    Eyes: Negative for visual disturbance.   Respiratory: Negative for shortness of breath.    Cardiovascular: Negative for chest pain.   Gastrointestinal: Negative for nausea.   Genitourinary: Negative for dysuria.   Musculoskeletal: Positive for back pain and myalgias. Negative for gait problem and neck pain.   Skin: Negative for rash.   Neurological: Negative for weakness and headaches.   Hematological: Does not bruise/bleed easily.   Psychiatric/Behavioral: Positive for dysphoric mood. The patient is nervous/anxious.    All other systems reviewed and are negative.      Physical Exam     Initial Vitals [08/14/20 1526]   BP Pulse Resp Temp SpO2   (!) 140/105 (!) 117 18 98.8 °F (37.1 °C) 95 %      MAP       --         Physical Exam    Nursing note and vitals reviewed.  Constitutional: She appears well-developed. She does not appear ill. No distress.   AF, /105,    HENT:   Head: Normocephalic and atraumatic.   Right Ear: External ear normal.   Left Ear: External ear normal.   Nose: Nose normal.   Eyes: Lids are normal.   Neck: Neck supple.   Cardiovascular: Normal rate.   Pulmonary/Chest: Effort normal. No respiratory distress.   Abdominal: She exhibits no distension.   Musculoskeletal:      Cervical back: Normal.      Thoracic back:  She exhibits tenderness and spasm.      Lumbar back: Normal.        Back:    Neurological: She is alert.   Skin: No rash noted.   Psychiatric: Her mood appears anxious. Thought content is paranoid and delusional. She expresses no suicidal plans and no homicidal plans.         ED Course   Procedures  Labs Reviewed - No data to display       Imaging Results    None          Medical Decision Making:   ED Management:  Findings, diagnosis & plan of care discussed with patient:  Thoracic back pain/spasm; will discharge patient home with Toradol and Robaxin, care instructions given -- further instructions given to follow-up with PCP    All questions answered, strict return precautions given, patient verbalized understanding to all instructions, pleasant visit -- vital signs are stable & patient is in no distress at discharge    Disclaimer:  This note was prepared with [employer] voice recognition transcription software. Garbled syntax, mangled pronouns, and other bizarre constructions may be attributed to that software system.                                   Clinical Impression:       ICD-10-CM ICD-9-CM   1. Acute left-sided thoracic back pain  M54.6 724.1   2. Spasm of thoracic back muscle  M62.830 724.8             ED Disposition Condition    Discharge Stable        ED Prescriptions     Medication Sig Dispense Start Date End Date Auth. Provider    ketorolac (TORADOL) 10 mg tablet Take 1 tablet (10 mg total) by mouth every 6 (six) hours as needed for Pain. 20 tablet 8/14/2020  Kolby Pineda NP    methocarbamoL (ROBAXIN) 500 MG Tab Take 1 tablet (500 mg total) by mouth 3 (three) times daily. for 5 days 15 tablet 8/14/2020 8/19/2020 Kolby Pineda NP        Follow-up Information     Follow up With Specialties Details Why Contact [facility]  Call  On mOnday to schedule a follow-up appointment                                      Kolby Pineda NP  08/14/20 9045

## 2020-08-14 NOTE — DISCHARGE INSTRUCTIONS
Take all medications as prescribed.      Download the [employer] power to access your health records & test results.  Please remember that you had a visit to the emergency room today and this does not substitute as primary care services for ongoing management because emergency services is a snap shot in time.  Should you have any worsening condition that requires emergency services do not hesitate to return to the ER.

## 2020-08-14 NOTE — ED TRIAGE NOTES
"Flight of ideas, very talkative, says she is upset.  She needs pain medicine for her back.  "My body is going through some kind of change."  "

## 2020-08-17 ENCOUNTER — HOSPITAL ENCOUNTER (EMERGENCY)
Facility: HOSPITAL | Age: 60
Discharge: PSYCHIATRIC OR SUBSTANCE USE FACILITY | End: 2020-08-17
Attending: FAMILY MEDICINE
Payer: MEDICAID

## 2020-08-17 VITALS
HEIGHT: 66 IN | OXYGEN SATURATION: 97 % | DIASTOLIC BLOOD PRESSURE: 70 MMHG | HEART RATE: 80 BPM | RESPIRATION RATE: 16 BRPM | WEIGHT: 136 LBS | SYSTOLIC BLOOD PRESSURE: 119 MMHG | TEMPERATURE: 99 F | BODY MASS INDEX: 21.86 KG/M2

## 2020-08-17 DIAGNOSIS — F29 PSYCHOTIC DISORDER WITH DELUSIONS: Primary | ICD-10-CM

## 2020-08-17 LAB
ALBUMIN SERPL BCP-MCNC: 4 G/DL (ref 3.5–5.2)
ALP SERPL-CCNC: 57 U/L (ref 55–135)
ALT SERPL W/O P-5'-P-CCNC: 9 U/L (ref 10–44)
AMPHET+METHAMPHET UR QL: NEGATIVE
ANION GAP SERPL CALC-SCNC: 8 MMOL/L (ref 8–16)
APAP SERPL-MCNC: <10 UG/ML (ref 10–20)
AST SERPL-CCNC: 14 U/L (ref 10–40)
BARBITURATES UR QL SCN>200 NG/ML: NEGATIVE
BASOPHILS # BLD AUTO: 0.05 K/UL (ref 0–0.2)
BASOPHILS NFR BLD: 0.5 % (ref 0–1.9)
BENZODIAZ UR QL SCN>200 NG/ML: NEGATIVE
BILIRUB SERPL-MCNC: 0.5 MG/DL (ref 0.1–1)
BILIRUB UR QL STRIP: NEGATIVE
BUN SERPL-MCNC: 14 MG/DL (ref 6–20)
BZE UR QL SCN: NEGATIVE
CALCIUM SERPL-MCNC: 8.8 MG/DL (ref 8.7–10.5)
CANNABINOIDS UR QL SCN: NORMAL
CHLORIDE SERPL-SCNC: 108 MMOL/L (ref 95–110)
CLARITY UR: CLEAR
CO2 SERPL-SCNC: 23 MMOL/L (ref 23–29)
COLOR UR: YELLOW
CREAT SERPL-MCNC: 0.7 MG/DL (ref 0.5–1.4)
CREAT UR-MCNC: 154.1 MG/DL (ref 15–325)
DIFFERENTIAL METHOD: ABNORMAL
EOSINOPHIL # BLD AUTO: 0.1 K/UL (ref 0–0.5)
EOSINOPHIL NFR BLD: 0.7 % (ref 0–8)
ERYTHROCYTE [DISTWIDTH] IN BLOOD BY AUTOMATED COUNT: 24 % (ref 11.5–14.5)
EST. GFR  (AFRICAN AMERICAN): >60 ML/MIN/1.73 M^2
EST. GFR  (NON AFRICAN AMERICAN): >60 ML/MIN/1.73 M^2
ETHANOL SERPL-MCNC: 7 MG/DL
GLUCOSE SERPL-MCNC: 90 MG/DL (ref 70–110)
GLUCOSE UR QL STRIP: NEGATIVE
HCT VFR BLD AUTO: 33.8 % (ref 37–48.5)
HGB BLD-MCNC: 10.2 G/DL (ref 12–16)
HGB UR QL STRIP: ABNORMAL
IMM GRANULOCYTES # BLD AUTO: 0.04 K/UL (ref 0–0.04)
IMM GRANULOCYTES NFR BLD AUTO: 0.4 % (ref 0–0.5)
KETONES UR QL STRIP: NEGATIVE
LEUKOCYTE ESTERASE UR QL STRIP: NEGATIVE
LYMPHOCYTES # BLD AUTO: 1.6 K/UL (ref 1–4.8)
LYMPHOCYTES NFR BLD: 14.8 % (ref 18–48)
MCH RBC QN AUTO: 22.2 PG (ref 27–31)
MCHC RBC AUTO-ENTMCNC: 30.2 G/DL (ref 32–36)
MCV RBC AUTO: 74 FL (ref 82–98)
MONOCYTES # BLD AUTO: 0.8 K/UL (ref 0.3–1)
MONOCYTES NFR BLD: 7.1 % (ref 4–15)
NEUTROPHILS # BLD AUTO: 8.5 K/UL (ref 1.8–7.7)
NEUTROPHILS NFR BLD: 76.5 % (ref 38–73)
NITRITE UR QL STRIP: NEGATIVE
NRBC BLD-RTO: 0 /100 WBC
OPIATES UR QL SCN: NEGATIVE
PCP UR QL SCN>25 NG/ML: NEGATIVE
PH UR STRIP: 6 [PH] (ref 5–8)
PLATELET # BLD AUTO: 356 K/UL (ref 150–350)
PMV BLD AUTO: 9.6 FL (ref 9.2–12.9)
POTASSIUM SERPL-SCNC: 3.8 MMOL/L (ref 3.5–5.1)
PROT SERPL-MCNC: 7.3 G/DL (ref 6–8.4)
PROT UR QL STRIP: NEGATIVE
RBC # BLD AUTO: 4.6 M/UL (ref 4–5.4)
SALICYLATES SERPL-MCNC: <4 MG/DL (ref 15–30)
SARS-COV-2 RDRP RESP QL NAA+PROBE: NEGATIVE
SODIUM SERPL-SCNC: 139 MMOL/L (ref 136–145)
SP GR UR STRIP: 1.02 (ref 1–1.03)
TOXICOLOGY INFORMATION: NORMAL
TSH SERPL DL<=0.005 MIU/L-ACNC: 1.16 UIU/ML (ref 0.34–5.6)
URN SPEC COLLECT METH UR: ABNORMAL
UROBILINOGEN UR STRIP-ACNC: NEGATIVE EU/DL
WBC # BLD AUTO: 11.05 K/UL (ref 3.9–12.7)

## 2020-08-17 PROCEDURE — 80320 DRUG SCREEN QUANTALCOHOLS: CPT

## 2020-08-17 PROCEDURE — 80307 DRUG TEST PRSMV CHEM ANLYZR: CPT

## 2020-08-17 PROCEDURE — 36415 COLL VENOUS BLD VENIPUNCTURE: CPT

## 2020-08-17 PROCEDURE — 25000003 PHARM REV CODE 250: Performed by: FAMILY MEDICINE

## 2020-08-17 PROCEDURE — 85025 COMPLETE CBC W/AUTO DIFF WBC: CPT

## 2020-08-17 PROCEDURE — 80053 COMPREHEN METABOLIC PANEL: CPT

## 2020-08-17 PROCEDURE — 84443 ASSAY THYROID STIM HORMONE: CPT

## 2020-08-17 PROCEDURE — 96372 THER/PROPH/DIAG INJ SC/IM: CPT

## 2020-08-17 PROCEDURE — 80329 ANALGESICS NON-OPIOID 1 OR 2: CPT

## 2020-08-17 PROCEDURE — 63600175 PHARM REV CODE 636 W HCPCS: Performed by: FAMILY MEDICINE

## 2020-08-17 PROCEDURE — U0002 COVID-19 LAB TEST NON-CDC: HCPCS

## 2020-08-17 PROCEDURE — 81003 URINALYSIS AUTO W/O SCOPE: CPT | Mod: 59

## 2020-08-17 PROCEDURE — 99285 EMERGENCY DEPT VISIT HI MDM: CPT | Mod: 25

## 2020-08-17 RX ORDER — HALOPERIDOL 5 MG/ML
5 INJECTION INTRAMUSCULAR
Status: COMPLETED | OUTPATIENT
Start: 2020-08-17 | End: 2020-08-17

## 2020-08-17 RX ORDER — LORAZEPAM 0.5 MG/1
1 TABLET ORAL
Status: COMPLETED | OUTPATIENT
Start: 2020-08-17 | End: 2020-08-17

## 2020-08-17 RX ADMIN — LORAZEPAM 1 MG: 0.5 TABLET ORAL at 03:08

## 2020-08-17 RX ADMIN — HALOPERIDOL LACTATE 5 MG: 5 INJECTION, SOLUTION INTRAMUSCULAR at 03:08

## 2020-08-17 NOTE — ED PROVIDER NOTES
Encounter Date: 8/17/2020       History     Chief Complaint   Patient presents with    mental issues     59-year-old female presents ambulatory complaining of needing help their use in their phones to get a my brain and control my thoughts patient is paranoid she has history of bipolar disorder with kirit and psychotic features she also has history of depression psychoses and schizoaffective disorder, approximately 4 months ago she had admission to psychiatric facility is requesting assistance again the medicines they gave me do not do nothing patient has no overt suicidal or homicidal ideation        Review of patient's allergies indicates:   Allergen Reactions    Codeine      Past Medical History:   Diagnosis Date    Bipolar affective disorder, manic, severe, with psychotic behavior 8/14/2014    Depression     Fatigue     History of psychiatric care     History of psychiatric hospitalization     Psychiatric problem     Psychosis 5/13/2015    Schizoaffective disorder     Schizophrenia      Past Surgical History:   Procedure Laterality Date    ANKLE SURGERY Right      Family History   Problem Relation Age of Onset    No Known Problems Mother     No Known Problems Father     No Known Problems Sister     No Known Problems Brother     No Known Problems Maternal Aunt     No Known Problems Paternal Aunt     No Known Problems Maternal Uncle     No Known Problems Paternal Uncle     No Known Problems Maternal Grandfather     No Known Problems Maternal Grandmother     No Known Problems Paternal Grandfather     No Known Problems Paternal Grandmother     No Known Problems Cousin     ADD / ADHD Neg Hx     Alcohol abuse Neg Hx     Anxiety disorder Neg Hx     Bipolar disorder Neg Hx     Dementia Neg Hx     Depression Neg Hx     Drug abuse Neg Hx     OCD Neg Hx     Paranoid behavior Neg Hx     Physical abuse Neg Hx     Schizophrenia Neg Hx     Seizures Neg Hx     Self injury Neg Hx      Sexual abuse Neg Hx     Suicide Neg Hx      Social History     Tobacco Use    Smoking status: Current Every Day Smoker     Packs/day: 0.50     Years: 34.00     Pack years: 17.00     Types: Cigarettes    Smokeless tobacco: Never Used   Substance Use Topics    Alcohol use: No    Drug use: Yes     Types: Marijuana     Review of Systems   Constitutional: Negative for fever.   HENT: Negative for sore throat.    Respiratory: Negative for shortness of breath.    Cardiovascular: Negative for chest pain.   Gastrointestinal: Negative for nausea.   Genitourinary: Negative for dysuria.   Musculoskeletal: Negative for back pain.   Skin: Negative for rash.   Neurological: Negative for weakness.   Hematological: Does not bruise/bleed easily.   Psychiatric/Behavioral: Positive for agitation, confusion, hallucinations and sleep disturbance. Negative for self-injury and suicidal ideas. The patient is nervous/anxious. The patient is not hyperactive.        Physical Exam     Initial Vitals [08/17/20 1423]   BP Pulse Resp Temp SpO2   (!) 134/101 104 18 98.9 °F (37.2 °C) 97 %      MAP       --         Physical Exam    Nursing note and vitals reviewed.  Constitutional: She appears well-developed and well-nourished. She is not diaphoretic. No distress.   HENT:   Head: Normocephalic and atraumatic.   Right Ear: External ear normal.   Left Ear: External ear normal.   Eyes: Pupils are equal, round, and reactive to light. Right eye exhibits no discharge. Left eye exhibits no discharge.   Neck: No tracheal deviation present. No JVD present.   Cardiovascular: Exam reveals no friction rub.    No murmur heard.  Pulmonary/Chest: No stridor. No respiratory distress. She has no wheezes. She has no rales.   Abdominal: Bowel sounds are normal. She exhibits no distension.   Musculoskeletal: Normal range of motion.   Neurological: She is alert.   Skin: Skin is warm.   Psychiatric: She has a normal mood and affect.         ED Course    Procedures  Labs Reviewed   CBC W/ AUTO DIFFERENTIAL - Abnormal; Notable for the following components:       Result Value    Hemoglobin 10.2 (*)     Hematocrit 33.8 (*)     Mean Corpuscular Volume 74 (*)     Mean Corpuscular Hemoglobin 22.2 (*)     Mean Corpuscular Hemoglobin Conc 30.2 (*)     RDW 24.0 (*)     Platelets 356 (*)     Gran # (ANC) 8.5 (*)     Gran% 76.5 (*)     Lymph% 14.8 (*)     All other components within normal limits   COMPREHENSIVE METABOLIC PANEL - Abnormal; Notable for the following components:    ALT 9 (*)     All other components within normal limits   URINALYSIS, REFLEX TO URINE CULTURE - Abnormal; Notable for the following components:    Occult Blood UA Trace (*)     All other components within normal limits    Narrative:     Specimen Source->Urine   SALICYLATE LEVEL - Abnormal; Notable for the following components:    Salicylate Lvl <4.0 (*)     All other components within normal limits   TSH   DRUG SCREEN PANEL, URINE EMERGENCY    Narrative:     Specimen Source->Urine   ALCOHOL,MEDICAL (ETHANOL)   ACETAMINOPHEN LEVEL   SARS-COV-2 RNA AMPLIFICATION, QUAL          Imaging Results    None                                      Medically cleared for psychiatry placement: 8/17/2020  4:18 PM    Clinical Impression:       ICD-10-CM ICD-9-CM   1. Psychotic disorder with delusions  F29 293.81             ED Disposition Condition    Transfer to Psych Facility         ED Prescriptions     None        Follow-up Information    None                                    Trey Kulkarni MD  08/17/20 8917

## 2020-08-17 NOTE — ED NOTES
Called received from Raven at [facility], stated patient is accepted. She is going to call the [facility] and let them know.

## 2020-08-17 NOTE — ED NOTES
Patient placed in ED 9. Patient calm and cooperative. Security screened patient. Patient dressed self in gown.

## 2020-09-10 ENCOUNTER — HOSPITAL ENCOUNTER (EMERGENCY)
Facility: HOSPITAL | Age: 60
Discharge: PSYCHIATRIC OR SUBSTANCE USE FACILITY | End: 2020-09-11
Attending: EMERGENCY MEDICINE
Payer: MEDICAID

## 2020-09-10 DIAGNOSIS — F22 DELUSIONAL DISORDER: ICD-10-CM

## 2020-09-10 DIAGNOSIS — F20.0 PARANOID SCHIZOPHRENIA: Primary | ICD-10-CM

## 2020-09-10 LAB
ALBUMIN SERPL BCP-MCNC: 4.2 G/DL (ref 3.5–5.2)
ALP SERPL-CCNC: 66 U/L (ref 55–135)
ALT SERPL W/O P-5'-P-CCNC: 23 U/L (ref 10–44)
AMPHET+METHAMPHET UR QL: NEGATIVE
ANION GAP SERPL CALC-SCNC: 11 MMOL/L (ref 8–16)
APAP SERPL-MCNC: <10 UG/ML (ref 10–20)
AST SERPL-CCNC: 17 U/L (ref 10–40)
B-HCG UR QL: NEGATIVE
BARBITURATES UR QL SCN>200 NG/ML: NEGATIVE
BASOPHILS # BLD AUTO: 0.08 K/UL (ref 0–0.2)
BASOPHILS NFR BLD: 0.7 % (ref 0–1.9)
BENZODIAZ UR QL SCN>200 NG/ML: NEGATIVE
BILIRUB SERPL-MCNC: 0.7 MG/DL (ref 0.1–1)
BILIRUB UR QL STRIP: NEGATIVE
BUN SERPL-MCNC: 10 MG/DL (ref 6–20)
BZE UR QL SCN: NEGATIVE
CALCIUM SERPL-MCNC: 8.9 MG/DL (ref 8.7–10.5)
CANNABINOIDS UR QL SCN: NORMAL
CHLORIDE SERPL-SCNC: 102 MMOL/L (ref 95–110)
CLARITY UR: CLEAR
CO2 SERPL-SCNC: 23 MMOL/L (ref 23–29)
COLOR UR: YELLOW
CREAT SERPL-MCNC: 0.9 MG/DL (ref 0.5–1.4)
CREAT UR-MCNC: 86.4 MG/DL (ref 15–325)
DIFFERENTIAL METHOD: ABNORMAL
EOSINOPHIL # BLD AUTO: 0.1 K/UL (ref 0–0.5)
EOSINOPHIL NFR BLD: 1.2 % (ref 0–8)
ERYTHROCYTE [DISTWIDTH] IN BLOOD BY AUTOMATED COUNT: 25.2 % (ref 11.5–14.5)
EST. GFR  (AFRICAN AMERICAN): >60 ML/MIN/1.73 M^2
EST. GFR  (NON AFRICAN AMERICAN): >60 ML/MIN/1.73 M^2
ETHANOL SERPL-MCNC: <5 MG/DL
GLUCOSE SERPL-MCNC: 92 MG/DL (ref 70–110)
GLUCOSE UR QL STRIP: NEGATIVE
HCT VFR BLD AUTO: 34 % (ref 37–48.5)
HGB BLD-MCNC: 10.4 G/DL (ref 12–16)
HGB UR QL STRIP: NEGATIVE
IMM GRANULOCYTES # BLD AUTO: 0.03 K/UL (ref 0–0.04)
IMM GRANULOCYTES NFR BLD AUTO: 0.3 % (ref 0–0.5)
KETONES UR QL STRIP: NEGATIVE
LEUKOCYTE ESTERASE UR QL STRIP: NEGATIVE
LYMPHOCYTES # BLD AUTO: 2.2 K/UL (ref 1–4.8)
LYMPHOCYTES NFR BLD: 18.8 % (ref 18–48)
MCH RBC QN AUTO: 22.6 PG (ref 27–31)
MCHC RBC AUTO-ENTMCNC: 30.6 G/DL (ref 32–36)
MCV RBC AUTO: 74 FL (ref 82–98)
MONOCYTES # BLD AUTO: 0.9 K/UL (ref 0.3–1)
MONOCYTES NFR BLD: 7.5 % (ref 4–15)
NEUTROPHILS # BLD AUTO: 8.2 K/UL (ref 1.8–7.7)
NEUTROPHILS NFR BLD: 71.5 % (ref 38–73)
NITRITE UR QL STRIP: NEGATIVE
NRBC BLD-RTO: 0 /100 WBC
OPIATES UR QL SCN: NEGATIVE
PCP UR QL SCN>25 NG/ML: NEGATIVE
PH UR STRIP: 7 [PH] (ref 5–8)
PLATELET # BLD AUTO: 412 K/UL (ref 150–350)
PMV BLD AUTO: 9.1 FL (ref 9.2–12.9)
POTASSIUM SERPL-SCNC: 3.9 MMOL/L (ref 3.5–5.1)
PROT SERPL-MCNC: 7.7 G/DL (ref 6–8.4)
PROT UR QL STRIP: NEGATIVE
RBC # BLD AUTO: 4.6 M/UL (ref 4–5.4)
SALICYLATES SERPL-MCNC: <4 MG/DL (ref 15–30)
SARS-COV-2 RDRP RESP QL NAA+PROBE: NEGATIVE
SODIUM SERPL-SCNC: 136 MMOL/L (ref 136–145)
SP GR UR STRIP: 1.02 (ref 1–1.03)
TOXICOLOGY INFORMATION: NORMAL
URN SPEC COLLECT METH UR: NORMAL
UROBILINOGEN UR STRIP-ACNC: NEGATIVE EU/DL
WBC # BLD AUTO: 11.49 K/UL (ref 3.9–12.7)

## 2020-09-10 PROCEDURE — 96372 THER/PROPH/DIAG INJ SC/IM: CPT

## 2020-09-10 PROCEDURE — 81003 URINALYSIS AUTO W/O SCOPE: CPT | Mod: 59

## 2020-09-10 PROCEDURE — 80329 ANALGESICS NON-OPIOID 1 OR 2: CPT

## 2020-09-10 PROCEDURE — 80307 DRUG TEST PRSMV CHEM ANLYZR: CPT

## 2020-09-10 PROCEDURE — U0002 COVID-19 LAB TEST NON-CDC: HCPCS

## 2020-09-10 PROCEDURE — 63600175 PHARM REV CODE 636 W HCPCS

## 2020-09-10 PROCEDURE — 36415 COLL VENOUS BLD VENIPUNCTURE: CPT

## 2020-09-10 PROCEDURE — 99285 EMERGENCY DEPT VISIT HI MDM: CPT | Mod: 25

## 2020-09-10 PROCEDURE — 25000003 PHARM REV CODE 250: Performed by: EMERGENCY MEDICINE

## 2020-09-10 PROCEDURE — 80320 DRUG SCREEN QUANTALCOHOLS: CPT

## 2020-09-10 PROCEDURE — 80053 COMPREHEN METABOLIC PANEL: CPT

## 2020-09-10 PROCEDURE — 81025 URINE PREGNANCY TEST: CPT

## 2020-09-10 PROCEDURE — 85025 COMPLETE CBC W/AUTO DIFF WBC: CPT

## 2020-09-10 RX ORDER — ZIPRASIDONE MESYLATE 20 MG/ML
10 INJECTION, POWDER, LYOPHILIZED, FOR SOLUTION INTRAMUSCULAR
Status: COMPLETED | OUTPATIENT
Start: 2020-09-10 | End: 2020-09-10

## 2020-09-10 RX ORDER — ZIPRASIDONE MESYLATE 20 MG/ML
INJECTION, POWDER, LYOPHILIZED, FOR SOLUTION INTRAMUSCULAR
Status: COMPLETED
Start: 2020-09-10 | End: 2020-09-10

## 2020-09-10 RX ORDER — DIPHENHYDRAMINE HCL 25 MG
25 CAPSULE ORAL
Status: COMPLETED | OUTPATIENT
Start: 2020-09-10 | End: 2020-09-10

## 2020-09-10 RX ADMIN — ZIPRASIDONE MESYLATE 10 MG: 20 INJECTION, POWDER, LYOPHILIZED, FOR SOLUTION INTRAMUSCULAR at 06:09

## 2020-09-10 RX ADMIN — DIPHENHYDRAMINE HYDROCHLORIDE 25 MG: 25 CAPSULE ORAL at 07:09
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
## 2020-09-10 NOTE — ED NOTES
ASSUMED CARE OF PATIENT, REPORT RECEIVED FROM REINALDO AGUILAR. PATIENT MEDICATED PER ORDER. SECURITY AT BEDSIDE. E VALENZUELA RN

## 2020-09-10 NOTE — ED TRIAGE NOTES
Pt reports headache, she is aggressive, abusive and suspicious of the NP as he tried to talk with her.  She is having flight of ideas, making not sense.  She denies HI or SI at present.  RN had to explain she needed to calm down or police would be called.  She complied with the request for the most part.
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
## 2020-09-11 VITALS
BODY MASS INDEX: 21.86 KG/M2 | TEMPERATURE: 98 F | DIASTOLIC BLOOD PRESSURE: 87 MMHG | SYSTOLIC BLOOD PRESSURE: 138 MMHG | OXYGEN SATURATION: 98 % | HEIGHT: 66 IN | WEIGHT: 136 LBS | RESPIRATION RATE: 20 BRPM | HEART RATE: 90 BPM

## 2020-09-11 PROCEDURE — 25000003 PHARM REV CODE 250: Performed by: EMERGENCY MEDICINE

## 2020-09-11 PROCEDURE — S0166 INJ OLANZAPINE 2.5MG: HCPCS | Performed by: EMERGENCY MEDICINE

## 2020-09-11 PROCEDURE — 63600175 PHARM REV CODE 636 W HCPCS: Performed by: EMERGENCY MEDICINE

## 2020-09-11 RX ORDER — OLANZAPINE 10 MG/2ML
10 INJECTION, POWDER, FOR SOLUTION INTRAMUSCULAR ONCE AS NEEDED
Status: COMPLETED | OUTPATIENT
Start: 2020-09-11 | End: 2020-09-11

## 2020-09-11 RX ORDER — LORAZEPAM 2 MG/ML
1 INJECTION INTRAMUSCULAR
Status: COMPLETED | OUTPATIENT
Start: 2020-09-11 | End: 2020-09-11

## 2020-09-11 RX ADMIN — OLANZAPINE 10 MG: 10 INJECTION, POWDER, LYOPHILIZED, FOR SOLUTION INTRAMUSCULAR at 09:09

## 2020-09-11 RX ADMIN — LORAZEPAM 1 MG: 2 INJECTION INTRAMUSCULAR; INTRAVENOUS at 01:09
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
## 2020-09-11 NOTE — ED NOTES
Lorena from CPT called to inform staff that patient has been accepted to [facility]. CPT to call [facility] to set up transport.

## 2020-09-11 NOTE — ED NOTES
"Called [facility] spoke with Thuy to get ETA on transport. States "Yes, we have her on the list. She is next to go."   "

## 2020-09-11 NOTE — ED NOTES
"Josee from [facility] who states "I received a referral for the patient. Unfortunately we are out of network with the patient's insurance, but I am calling to offer a payment plan. The payment plan I have to offer is the patient would have to have $5000 up front and $800 a month."     Informed Josee that the patient is on fixed income and I highly doubt the patient would be able to afford that at this time.    "

## 2020-09-11 NOTE — ED NOTES
PATIENT A&O, CALM AND COOPERATIVE AT TIME OF SHIFT CHANGE. WARM BLANKET PROVIDED. LIGHTS DIMMED FOR COMFORT. JIMMIE VALENZUELA RN

## 2021-04-06 ENCOUNTER — HOSPITAL ENCOUNTER (EMERGENCY)
Facility: HOSPITAL | Age: 61
Discharge: PSYCHIATRIC OR SUBSTANCE USE FACILITY | End: 2021-04-06
Attending: EMERGENCY MEDICINE
Payer: MEDICAID

## 2021-04-06 VITALS
DIASTOLIC BLOOD PRESSURE: 82 MMHG | HEART RATE: 84 BPM | SYSTOLIC BLOOD PRESSURE: 136 MMHG | HEIGHT: 66 IN | WEIGHT: 138 LBS | TEMPERATURE: 98 F | RESPIRATION RATE: 18 BRPM | OXYGEN SATURATION: 99 % | BODY MASS INDEX: 22.18 KG/M2

## 2021-04-06 DIAGNOSIS — F22 PARANOID DELUSION: Primary | ICD-10-CM

## 2021-04-06 DIAGNOSIS — G89.29 CHRONIC NECK PAIN: ICD-10-CM

## 2021-04-06 DIAGNOSIS — M54.2 CHRONIC NECK PAIN: ICD-10-CM

## 2021-04-06 LAB
ALBUMIN SERPL BCP-MCNC: 3.8 G/DL (ref 3.5–5.2)
ALP SERPL-CCNC: 82 U/L (ref 55–135)
ALT SERPL W/O P-5'-P-CCNC: 14 U/L (ref 10–44)
AMPHET+METHAMPHET UR QL: NEGATIVE
ANION GAP SERPL CALC-SCNC: 9 MMOL/L (ref 8–16)
APAP SERPL-MCNC: <3 UG/ML (ref 10–20)
AST SERPL-CCNC: 14 U/L (ref 10–40)
BACTERIA #/AREA URNS AUTO: ABNORMAL /HPF
BARBITURATES UR QL SCN>200 NG/ML: NEGATIVE
BASOPHILS # BLD AUTO: 0.06 K/UL (ref 0–0.2)
BASOPHILS NFR BLD: 0.6 % (ref 0–1.9)
BENZODIAZ UR QL SCN>200 NG/ML: NEGATIVE
BILIRUB SERPL-MCNC: 0.7 MG/DL (ref 0.1–1)
BILIRUB UR QL STRIP: NEGATIVE
BUN SERPL-MCNC: 6 MG/DL (ref 6–20)
BZE UR QL SCN: NEGATIVE
CALCIUM SERPL-MCNC: 9 MG/DL (ref 8.7–10.5)
CANNABINOIDS UR QL SCN: NORMAL
CHLORIDE SERPL-SCNC: 103 MMOL/L (ref 95–110)
CLARITY UR REFRACT.AUTO: CLEAR
CO2 SERPL-SCNC: 25 MMOL/L (ref 23–29)
COLOR UR AUTO: YELLOW
CREAT SERPL-MCNC: 0.8 MG/DL (ref 0.5–1.4)
CREAT UR-MCNC: 96 MG/DL (ref 15–325)
CTP QC/QA: YES
DIFFERENTIAL METHOD: ABNORMAL
EOSINOPHIL # BLD AUTO: 0.2 K/UL (ref 0–0.5)
EOSINOPHIL NFR BLD: 2 % (ref 0–8)
ERYTHROCYTE [DISTWIDTH] IN BLOOD BY AUTOMATED COUNT: 21.9 % (ref 11.5–14.5)
EST. GFR  (AFRICAN AMERICAN): >60 ML/MIN/1.73 M^2
EST. GFR  (NON AFRICAN AMERICAN): >60 ML/MIN/1.73 M^2
ETHANOL SERPL-MCNC: <10 MG/DL
GLUCOSE SERPL-MCNC: 90 MG/DL (ref 70–110)
GLUCOSE UR QL STRIP: NEGATIVE
HCT VFR BLD AUTO: 39 % (ref 37–48.5)
HCV AB SERPL QL IA: POSITIVE
HGB BLD-MCNC: 12.3 G/DL (ref 12–16)
HGB UR QL STRIP: ABNORMAL
HIV 1+2 AB+HIV1 P24 AG SERPL QL IA: NEGATIVE
IMM GRANULOCYTES # BLD AUTO: 0.05 K/UL (ref 0–0.04)
IMM GRANULOCYTES NFR BLD AUTO: 0.5 % (ref 0–0.5)
KETONES UR QL STRIP: NEGATIVE
LEUKOCYTE ESTERASE UR QL STRIP: ABNORMAL
LYMPHOCYTES # BLD AUTO: 2.8 K/UL (ref 1–4.8)
LYMPHOCYTES NFR BLD: 26.1 % (ref 18–48)
MCH RBC QN AUTO: 25.1 PG (ref 27–31)
MCHC RBC AUTO-ENTMCNC: 31.5 G/DL (ref 32–36)
MCV RBC AUTO: 79 FL (ref 82–98)
METHADONE UR QL SCN>300 NG/ML: NEGATIVE
MICROSCOPIC COMMENT: ABNORMAL
MONOCYTES # BLD AUTO: 0.8 K/UL (ref 0.3–1)
MONOCYTES NFR BLD: 7.5 % (ref 4–15)
NEUTROPHILS # BLD AUTO: 6.8 K/UL (ref 1.8–7.7)
NEUTROPHILS NFR BLD: 63.3 % (ref 38–73)
NITRITE UR QL STRIP: NEGATIVE
NRBC BLD-RTO: 0 /100 WBC
OPIATES UR QL SCN: NEGATIVE
PCP UR QL SCN>25 NG/ML: NEGATIVE
PH UR STRIP: 5 [PH] (ref 5–8)
PLATELET # BLD AUTO: 365 K/UL (ref 150–450)
PMV BLD AUTO: 10 FL (ref 9.2–12.9)
POTASSIUM SERPL-SCNC: 3.9 MMOL/L (ref 3.5–5.1)
PROT SERPL-MCNC: 7.5 G/DL (ref 6–8.4)
PROT UR QL STRIP: NEGATIVE
RBC # BLD AUTO: 4.91 M/UL (ref 4–5.4)
RBC #/AREA URNS AUTO: 14 /HPF (ref 0–4)
SARS-COV-2 RDRP RESP QL NAA+PROBE: NEGATIVE
SODIUM SERPL-SCNC: 137 MMOL/L (ref 136–145)
SP GR UR STRIP: 1.01 (ref 1–1.03)
SQUAMOUS #/AREA URNS AUTO: 3 /HPF
TOXICOLOGY INFORMATION: NORMAL
TSH SERPL DL<=0.005 MIU/L-ACNC: 1.86 UIU/ML (ref 0.4–4)
URN SPEC COLLECT METH UR: ABNORMAL
WBC # BLD AUTO: 10.65 K/UL (ref 3.9–12.7)
WBC #/AREA URNS AUTO: 1 /HPF (ref 0–5)

## 2021-04-06 PROCEDURE — 99285 EMERGENCY DEPT VISIT HI MDM: CPT | Mod: 25

## 2021-04-06 PROCEDURE — 86703 HIV-1/HIV-2 1 RESULT ANTBDY: CPT | Performed by: EMERGENCY MEDICINE

## 2021-04-06 PROCEDURE — 96374 THER/PROPH/DIAG INJ IV PUSH: CPT

## 2021-04-06 PROCEDURE — 25000003 PHARM REV CODE 250: Performed by: STUDENT IN AN ORGANIZED HEALTH CARE EDUCATION/TRAINING PROGRAM

## 2021-04-06 PROCEDURE — 80307 DRUG TEST PRSMV CHEM ANLYZR: CPT | Performed by: STUDENT IN AN ORGANIZED HEALTH CARE EDUCATION/TRAINING PROGRAM

## 2021-04-06 PROCEDURE — 96361 HYDRATE IV INFUSION ADD-ON: CPT

## 2021-04-06 PROCEDURE — 85025 COMPLETE CBC W/AUTO DIFF WBC: CPT | Performed by: STUDENT IN AN ORGANIZED HEALTH CARE EDUCATION/TRAINING PROGRAM

## 2021-04-06 PROCEDURE — 80053 COMPREHEN METABOLIC PANEL: CPT | Performed by: STUDENT IN AN ORGANIZED HEALTH CARE EDUCATION/TRAINING PROGRAM

## 2021-04-06 PROCEDURE — 99284 PR EMERGENCY DEPT VISIT,LEVEL IV: ICD-10-PCS | Mod: CS,,, | Performed by: EMERGENCY MEDICINE

## 2021-04-06 PROCEDURE — 84443 ASSAY THYROID STIM HORMONE: CPT | Performed by: STUDENT IN AN ORGANIZED HEALTH CARE EDUCATION/TRAINING PROGRAM

## 2021-04-06 PROCEDURE — 99284 EMERGENCY DEPT VISIT MOD MDM: CPT | Mod: CS,,, | Performed by: EMERGENCY MEDICINE

## 2021-04-06 PROCEDURE — 80143 DRUG ASSAY ACETAMINOPHEN: CPT | Performed by: STUDENT IN AN ORGANIZED HEALTH CARE EDUCATION/TRAINING PROGRAM

## 2021-04-06 PROCEDURE — 63600175 PHARM REV CODE 636 W HCPCS: Performed by: STUDENT IN AN ORGANIZED HEALTH CARE EDUCATION/TRAINING PROGRAM

## 2021-04-06 PROCEDURE — U0002 COVID-19 LAB TEST NON-CDC: HCPCS | Performed by: EMERGENCY MEDICINE

## 2021-04-06 PROCEDURE — 87522 HEPATITIS C REVRS TRNSCRPJ: CPT | Performed by: STUDENT IN AN ORGANIZED HEALTH CARE EDUCATION/TRAINING PROGRAM

## 2021-04-06 PROCEDURE — 96375 TX/PRO/DX INJ NEW DRUG ADDON: CPT

## 2021-04-06 PROCEDURE — 86803 HEPATITIS C AB TEST: CPT | Performed by: EMERGENCY MEDICINE

## 2021-04-06 PROCEDURE — 81001 URINALYSIS AUTO W/SCOPE: CPT | Performed by: STUDENT IN AN ORGANIZED HEALTH CARE EDUCATION/TRAINING PROGRAM

## 2021-04-06 PROCEDURE — 82077 ASSAY SPEC XCP UR&BREATH IA: CPT | Performed by: STUDENT IN AN ORGANIZED HEALTH CARE EDUCATION/TRAINING PROGRAM

## 2021-04-06 RX ORDER — DROPERIDOL 2.5 MG/ML
2.5 INJECTION, SOLUTION INTRAMUSCULAR; INTRAVENOUS
Status: COMPLETED | OUTPATIENT
Start: 2021-04-06 | End: 2021-04-06

## 2021-04-06 RX ORDER — NAPROXEN 500 MG/1
500 TABLET ORAL 2 TIMES DAILY WITH MEALS
Qty: 14 TABLET | Refills: 0 | Status: ON HOLD | OUTPATIENT
Start: 2021-04-06 | End: 2021-04-12 | Stop reason: SDUPTHER

## 2021-04-06 RX ORDER — KETOROLAC TROMETHAMINE 30 MG/ML
10 INJECTION, SOLUTION INTRAMUSCULAR; INTRAVENOUS
Status: COMPLETED | OUTPATIENT
Start: 2021-04-06 | End: 2021-04-06

## 2021-04-06 RX ADMIN — SODIUM CHLORIDE 1000 ML: 0.9 INJECTION, SOLUTION INTRAVENOUS at 03:04

## 2021-04-06 RX ADMIN — DROPERIDOL 2.5 MG: 2.5 INJECTION, SOLUTION INTRAMUSCULAR; INTRAVENOUS at 03:04

## 2021-04-06 RX ADMIN — KETOROLAC TROMETHAMINE 10 MG: 30 INJECTION, SOLUTION INTRAMUSCULAR at 03:04

## 2021-04-09 LAB
HCV RNA SERPL NAA+PROBE-LOG IU: <1.08 LOG (10) IU/ML
HCV RNA SERPL QL NAA+PROBE: NOT DETECTED IU/ML
HCV RNA SPEC NAA+PROBE-ACNC: <12 IU/ML

## 2022-05-13 NOTE — ED TRIAGE NOTES
Pt denies SI or HI, but she is speaking about things that make no sense and are not relevant or realistic.  She says she is in a pain orestes and that she needs help at a orestes center.  Attempted to explain to pt but she can not understand.    Home

## 2022-06-20 ENCOUNTER — HOSPITAL ENCOUNTER (EMERGENCY)
Facility: HOSPITAL | Age: 62
Discharge: HOME | End: 2022-06-20
Attending: INTERNAL MEDICINE
Payer: MEDICAID

## 2022-06-20 VITALS
WEIGHT: 145 LBS | BODY MASS INDEX: 23.3 KG/M2 | TEMPERATURE: 99 F | DIASTOLIC BLOOD PRESSURE: 77 MMHG | SYSTOLIC BLOOD PRESSURE: 107 MMHG | RESPIRATION RATE: 20 BRPM | HEART RATE: 86 BPM | HEIGHT: 66 IN | OXYGEN SATURATION: 96 %

## 2022-06-20 DIAGNOSIS — R52 PAIN: ICD-10-CM

## 2022-06-20 DIAGNOSIS — S16.1XXA STRAIN OF NECK MUSCLE, INITIAL ENCOUNTER: Primary | ICD-10-CM

## 2022-06-20 DIAGNOSIS — M54.2 NECK PAIN: ICD-10-CM

## 2022-06-20 PROCEDURE — 99284 EMERGENCY DEPT VISIT MOD MDM: CPT | Mod: 25

## 2022-06-20 PROCEDURE — 93005 ELECTROCARDIOGRAM TRACING: CPT

## 2022-06-20 PROCEDURE — 72040 X-RAY EXAM NECK SPINE 2-3 VW: CPT | Mod: TC,FY

## 2022-06-20 PROCEDURE — 96372 THER/PROPH/DIAG INJ SC/IM: CPT | Performed by: INTERNAL MEDICINE

## 2022-06-20 PROCEDURE — 93010 EKG 12-LEAD: ICD-10-PCS | Mod: ,,, | Performed by: INTERNAL MEDICINE

## 2022-06-20 PROCEDURE — 63600175 PHARM REV CODE 636 W HCPCS: Performed by: INTERNAL MEDICINE

## 2022-06-20 PROCEDURE — 93010 ELECTROCARDIOGRAM REPORT: CPT | Mod: ,,, | Performed by: INTERNAL MEDICINE

## 2022-06-20 PROCEDURE — 72040 XR CERVICAL SPINE AP LATERAL: ICD-10-PCS | Mod: 26,,, | Performed by: RADIOLOGY

## 2022-06-20 PROCEDURE — 72040 X-RAY EXAM NECK SPINE 2-3 VW: CPT | Mod: 26,,, | Performed by: RADIOLOGY

## 2022-06-20 RX ORDER — NAPROXEN 500 MG/1
500 TABLET ORAL 2 TIMES DAILY WITH MEALS
Qty: 14 TABLET | Refills: 0 | Status: SHIPPED | OUTPATIENT
Start: 2022-06-20 | End: 2022-06-27

## 2022-06-20 RX ORDER — KETOROLAC TROMETHAMINE 30 MG/ML
30 INJECTION, SOLUTION INTRAMUSCULAR; INTRAVENOUS
Status: COMPLETED | OUTPATIENT
Start: 2022-06-20 | End: 2022-06-20

## 2022-06-20 RX ADMIN — KETOROLAC TROMETHAMINE 30 MG: 30 INJECTION, SOLUTION INTRAMUSCULAR; INTRAVENOUS at 05:06

## 2022-06-20 NOTE — ED PROVIDER NOTES
Encounter Date: 6/20/2022       History     Chief Complaint   Patient presents with    Neck Pain    Mental Health Problem     Patient complaining of left-sided neck pain and muscle spasm for the last several weeks.  States that it is associated with no tear which is living in.  She denies any falls, head trauma call paralysis of her lower extremity, incontinence urine or bowel or difficulty walking and standing no headache.  Denies any angina or chest pain.        Review of patient's allergies indicates:   Allergen Reactions    Codeine Hives     Past Medical History:   Diagnosis Date    Bipolar affective disorder, manic, severe, with psychotic behavior 8/14/2014    Depression     Fatigue     History of psychiatric care     History of psychiatric hospitalization     Psychiatric problem     Psychosis 5/13/2015    Schizoaffective disorder     Schizophrenia      Past Surgical History:   Procedure Laterality Date    ANKLE SURGERY Right     TONSILLECTOMY       Family History   Problem Relation Age of Onset    No Known Problems Mother     No Known Problems Father     No Known Problems Sister     No Known Problems Brother     No Known Problems Maternal Aunt     No Known Problems Paternal Aunt     No Known Problems Maternal Uncle     No Known Problems Paternal Uncle     No Known Problems Maternal Grandfather     No Known Problems Maternal Grandmother     No Known Problems Paternal Grandfather     No Known Problems Paternal Grandmother     No Known Problems Cousin     ADD / ADHD Neg Hx     Alcohol abuse Neg Hx     Anxiety disorder Neg Hx     Bipolar disorder Neg Hx     Dementia Neg Hx     Depression Neg Hx     Drug abuse Neg Hx     OCD Neg Hx     Paranoid behavior Neg Hx     Physical abuse Neg Hx     Schizophrenia Neg Hx     Seizures Neg Hx     Self injury Neg Hx     Sexual abuse Neg Hx     Suicide Neg Hx      Social History     Tobacco Use    Smoking status: Current Every Day Smoker      Packs/day: 0.50     Years: 34.00     Pack years: 17.00     Types: Cigarettes    Smokeless tobacco: Never Used   Substance Use Topics    Alcohol use: No    Drug use: Yes     Types: Marijuana     Review of Systems   All other systems reviewed and are negative.      Physical Exam     Initial Vitals [06/20/22 1622]   BP Pulse Resp Temp SpO2   107/77 86 20 98.6 °F (37 °C) 96 %      MAP       --         Physical Exam    Vitals reviewed.  Constitutional: She appears well-developed.   Eyes: Pupils are equal, round, and reactive to light.   Neck: Trachea normal. Neck supple.       Repeat reducible pain   Cardiovascular: Normal rate.   Pulmonary/Chest: Breath sounds normal.   Abdominal: Abdomen is soft.   Musculoskeletal:      Cervical back: Neck supple. Muscular tenderness present. Decreased range of motion.     Neurological: She is alert.   Skin: Skin is warm.   Psychiatric: She has a normal mood and affect.         ED Course   Procedures  Labs Reviewed - No data to display     ECG Results          EKG 12-lead (Preliminary result)  Result time 06/20/22 16:51:43    ED Interpretation by Romeo Barger MD (06/20/22 16:51:43, [facility], Emergency Medicine)    Normal sinus rhythm rate of 71, no acute ischemic changes, no change from EKG in 2019 or 2015.                             Imaging Results          X-Ray Cervical Spine AP And Lateral (Preliminary result)  Result time 06/20/22 17:44:58    ED Interpretation by Romeo Barger MD (06/20/22 17:39:20, [facility], Emergency Medicine)    No fracture dislocation                               Medications   ketorolac injection 30 mg (has no administration in time range)     Medical Decision Making:   ED Management:  Discussed with the patient, she wants something for pain, she wants prescription for medical marijuana, however a total of will find her a referral to a primary care provider in clinic for further             ED Course as of 06/20/22  1746   Mon Jun 20, 2022   1651 EKG 12-lead [PW]   1739 X-Ray Cervical Spine AP And Lateral [PW]      ED Course User Index  [PW] Romeo Barger MD             Clinical Impression:   Final diagnoses:  [R52] Pain  [M54.2] Neck pain  [S16.1XXA] Strain of neck muscle, initial encounter (Primary)          ED Disposition Condition    Discharge Stable        ED Prescriptions     Medication Sig Dispense Start Date End Date Auth. Provider    naproxen (NAPROSYN) 500 MG tablet Take 1 tablet (500 mg total) by mouth 2 (two) times daily with meals. for 7 days 14 tablet 6/20/2022 6/27/2022 Romeo Barger MD        Follow-up Information    None          Romeo Barger MD  06/20/22 [id number]
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
## 2022-07-13 ENCOUNTER — HOSPITAL ENCOUNTER (EMERGENCY)
Facility: HOSPITAL | Age: 62
Discharge: HOME | End: 2022-07-13
Attending: EMERGENCY MEDICINE
Payer: MEDICAID

## 2022-07-13 VITALS
BODY MASS INDEX: 21.97 KG/M2 | SYSTOLIC BLOOD PRESSURE: 128 MMHG | RESPIRATION RATE: 20 BRPM | HEIGHT: 67 IN | HEART RATE: 94 BPM | WEIGHT: 140 LBS | DIASTOLIC BLOOD PRESSURE: 88 MMHG | TEMPERATURE: 97 F | OXYGEN SATURATION: 99 %

## 2022-07-13 DIAGNOSIS — F25.0 SCHIZOAFFECTIVE DISORDER, BIPOLAR TYPE: Primary | ICD-10-CM

## 2022-07-13 DIAGNOSIS — R44.3 HALLUCINATIONS: ICD-10-CM

## 2022-07-13 PROCEDURE — 99283 EMERGENCY DEPT VISIT LOW MDM: CPT

## 2022-07-13 RX ORDER — OLANZAPINE 10 MG/1
10 TABLET ORAL NIGHTLY
Qty: 30 TABLET | Refills: 2 | Status: SHIPPED | OUTPATIENT
Start: 2022-07-13 | End: 2023-07-13

## 2022-07-13 NOTE — ED PROVIDER NOTES
"Encounter Date: 7/13/2022       History     Chief Complaint   Patient presents with    Medication Refill    Mental Health Problem     Pt. Stated she needs something to "tranquilize" her pain and medication to "take pressure off from the electronics." Pt stated "the electronics" are "nagging" her. Pt stated she was seen here and prescribed medication and it caused severe stomach pains and she want to be prescribed a different medication this time.     Hallucinations     Pt in triage and is responding to voices that are not heard by RN.     61-year-old white female presents ER requesting tranquilizers for pressure caused by cell phone.  Patient has psychiatric history for schizophrenia, psychosis, and bipolar disorder.  Patient reports that she walked to the ER and lives alone.        Review of patient's allergies indicates:   Allergen Reactions    Codeine Hives     Past Medical History:   Diagnosis Date    Bipolar affective disorder, manic, severe, with psychotic behavior 8/14/2014    Depression     Fatigue     History of psychiatric care     History of psychiatric hospitalization     Psychiatric problem     Psychosis 5/13/2015    Schizoaffective disorder     Schizophrenia      Past Surgical History:   Procedure Laterality Date    ANKLE SURGERY Right     TONSILLECTOMY       Family History   Problem Relation Age of Onset    No Known Problems Mother     No Known Problems Father     No Known Problems Sister     No Known Problems Brother     No Known Problems Maternal Aunt     No Known Problems Paternal Aunt     No Known Problems Maternal Uncle     No Known Problems Paternal Uncle     No Known Problems Maternal Grandfather     No Known Problems Maternal Grandmother     No Known Problems Paternal Grandfather     No Known Problems Paternal Grandmother     No Known Problems Cousin     ADD / ADHD Neg Hx     Alcohol abuse Neg Hx     Anxiety disorder Neg Hx     Bipolar disorder Neg Hx     " "Dementia Neg Hx     Depression Neg Hx     Drug abuse Neg Hx     OCD Neg Hx     Paranoid behavior Neg Hx     Physical abuse Neg Hx     Schizophrenia Neg Hx     Seizures Neg Hx     Self injury Neg Hx     Sexual abuse Neg Hx     Suicide Neg Hx      Social History     Tobacco Use    Smoking status: Current Every Day Smoker     Packs/day: 0.50     Years: 34.00     Pack years: 17.00     Types: Cigarettes    Smokeless tobacco: Never Used   Substance Use Topics    Alcohol use: No    Drug use: Yes     Types: Marijuana     Comment: pt. reports "medical marijiana"     Review of Systems   Psychiatric/Behavioral: Positive for confusion, hallucinations and sleep disturbance. Negative for self-injury and suicidal ideas.   All other systems reviewed and are negative.      Physical Exam     Initial Vitals [07/13/22 1638]   BP Pulse Resp Temp SpO2   (!) 127/107 94 20 97.4 °F (36.3 °C) 99 %      MAP       --         Physical Exam    Nursing note and vitals reviewed.  Constitutional: She appears well-developed and well-nourished.   HENT:   Head: Normocephalic and atraumatic.   Eyes: EOM are normal. Pupils are equal, round, and reactive to light.   Neck:   Normal range of motion.  Cardiovascular: Normal rate.   Abdominal: Abdomen is soft.   Musculoskeletal:      Cervical back: Normal range of motion.     Neurological: She is alert and oriented to person, place, and time. She has normal strength.   Skin: Skin is warm and dry.   Psychiatric:   Abnormal thought content         ED Course   Procedures  Labs Reviewed - No data to display       Imaging Results    None          Medications - No data to display  Medical Decision Making:   Initial Assessment:   61-year-old white female presents to emergency room requesting tranquilizers for pressure caused by cell phone.  Patient states current medication are not helping and is requesting medication change.  Patient is alert and oriented and ambulatory.  Patient denies suicide " ideation and is not threatening towards others.  Differential Diagnosis:   Psychosis, Drug Induced Psychosis, Adverse Reaction Medication  ED Management:  Will change patient's description to Zyprexa and place consult for psychiatric care then discharge home.                      Clinical Impression:   Final diagnoses:  [R44.3] Hallucinations  [F25.0] Schizoaffective disorder, bipolar type (Primary)          ED Disposition Condition    Discharge Stable        ED Prescriptions     Medication Sig Dispense Start Date End Date Auth. Provider    OLANZapine (ZYPREXA) 10 MG tablet Take 1 tablet (10 mg total) by mouth every evening. 30 tablet 7/13/2022 7/13/2023 Galileo Jenkins NP        Follow-up Information    None          Galileo Jenkins NP  07/13/22 [id number]

## 2022-07-13 NOTE — FIRST PROVIDER EVALUATION
"Medical screening exam completed.  I have conducted a focused provider triage encounter, findings are as follows:    Brief history of present illness:  61-year-old white female presents ER requesting tranquilizers for pressure caused by cell phone.  Patient has psychiatric history for schizophrenia, psychosis, and bipolar disorder.  Patient reports that she walked to the ER and lives alone.***    Vitals:    07/13/22 1638 07/13/22 1907   BP: (!) 127/107 128/88   BP Location: Left arm Left arm   Patient Position: Sitting Sitting   Pulse: 94    Resp: 20    Temp: 97.4 °F (36.3 °C)    TempSrc: Oral    SpO2: 99%    Weight: 63.5 kg (140 lb)    Height: 5' 7" (1.702 m)        Pertinent physical exam:  ***Patient is alert and oriented and ambulatory.  Patient denies suicide ideation and is not threatening towards others.    Brief workup plan:  ***Will change patient's description to Zyprexa and place consult for psychiatric care then discharge home    Preliminary workup initiated; this workup will be continued and followed by the physician or advanced practice provider that is assigned to the patient when roomed.  "

## 2022-09-18 ENCOUNTER — HOSPITAL ENCOUNTER (EMERGENCY)
Facility: HOSPITAL | Age: 62
Discharge: PSYCHIATRIC OR SUBSTANCE USE FACILITY | End: 2022-09-19
Attending: INTERNAL MEDICINE
Payer: MEDICAID

## 2022-09-18 VITALS
SYSTOLIC BLOOD PRESSURE: 107 MMHG | WEIGHT: 132 LBS | BODY MASS INDEX: 21.21 KG/M2 | HEART RATE: 101 BPM | HEIGHT: 66 IN | OXYGEN SATURATION: 97 % | TEMPERATURE: 98 F | RESPIRATION RATE: 19 BRPM | DIASTOLIC BLOOD PRESSURE: 90 MMHG

## 2022-09-18 DIAGNOSIS — Z00.8 MEDICAL CLEARANCE FOR PSYCHIATRIC ADMISSION: ICD-10-CM

## 2022-09-18 DIAGNOSIS — F23 SCHIZOPHRENIA, ACUTE: ICD-10-CM

## 2022-09-18 DIAGNOSIS — R44.3 HALLUCINATIONS: ICD-10-CM

## 2022-09-18 DIAGNOSIS — F23 ACUTE PSYCHOSIS: Primary | ICD-10-CM

## 2022-09-18 LAB
ALBUMIN SERPL BCP-MCNC: 3.9 G/DL (ref 3.5–5.2)
ALP SERPL-CCNC: 81 U/L (ref 55–135)
ALT SERPL W/O P-5'-P-CCNC: 25 U/L (ref 10–44)
AMPHET+METHAMPHET UR QL: NEGATIVE
ANION GAP SERPL CALC-SCNC: 14 MMOL/L (ref 8–16)
APAP SERPL-MCNC: <3 UG/ML (ref 10–20)
AST SERPL-CCNC: 16 U/L (ref 10–40)
BACTERIA #/AREA URNS HPF: ABNORMAL /HPF
BARBITURATES UR QL SCN>200 NG/ML: NEGATIVE
BASOPHILS # BLD AUTO: 0.08 K/UL (ref 0–0.2)
BASOPHILS NFR BLD: 1 % (ref 0–1.9)
BENZODIAZ UR QL SCN>200 NG/ML: NEGATIVE
BILIRUB SERPL-MCNC: 0.8 MG/DL (ref 0.1–1)
BILIRUB UR QL STRIP: ABNORMAL
BUN SERPL-MCNC: 12 MG/DL (ref 6–20)
BZE UR QL SCN: NEGATIVE
CALCIUM SERPL-MCNC: 9.5 MG/DL (ref 8.7–10.5)
CANNABINOIDS UR QL SCN: ABNORMAL
CHLORIDE SERPL-SCNC: 103 MMOL/L (ref 95–110)
CLARITY UR: CLEAR
CO2 SERPL-SCNC: 22 MMOL/L (ref 23–29)
COLOR UR: YELLOW
CREAT SERPL-MCNC: 1 MG/DL (ref 0.5–1.4)
CREAT UR-MCNC: 392.7 MG/DL (ref 15–325)
DIFFERENTIAL METHOD: ABNORMAL
EOSINOPHIL # BLD AUTO: 0.2 K/UL (ref 0–0.5)
EOSINOPHIL NFR BLD: 2.4 % (ref 0–8)
ERYTHROCYTE [DISTWIDTH] IN BLOOD BY AUTOMATED COUNT: 23.4 % (ref 11.5–14.5)
EST. GFR  (NO RACE VARIABLE): >60 ML/MIN/1.73 M^2
ETHANOL SERPL-MCNC: <10 MG/DL (ref 0–10)
GLUCOSE SERPL-MCNC: 91 MG/DL (ref 70–110)
GLUCOSE UR QL STRIP: NEGATIVE
HCT VFR BLD AUTO: 39.8 % (ref 37–48.5)
HGB BLD-MCNC: 12.5 G/DL (ref 12–16)
HGB UR QL STRIP: ABNORMAL
HYALINE CASTS #/AREA URNS LPF: 0 /LPF
IMM GRANULOCYTES # BLD AUTO: 0.01 K/UL (ref 0–0.04)
IMM GRANULOCYTES NFR BLD AUTO: 0.1 % (ref 0–0.5)
KETONES UR QL STRIP: NEGATIVE
LEUKOCYTE ESTERASE UR QL STRIP: NEGATIVE
LYMPHOCYTES # BLD AUTO: 2.3 K/UL (ref 1–4.8)
LYMPHOCYTES NFR BLD: 27.2 % (ref 18–48)
MCH RBC QN AUTO: 24.9 PG (ref 27–31)
MCHC RBC AUTO-ENTMCNC: 31.4 G/DL (ref 32–36)
MCV RBC AUTO: 79 FL (ref 82–98)
METHADONE UR QL SCN>300 NG/ML: NEGATIVE
MICROSCOPIC COMMENT: ABNORMAL
MONOCYTES # BLD AUTO: 0.7 K/UL (ref 0.3–1)
MONOCYTES NFR BLD: 8.4 % (ref 4–15)
NEUTROPHILS # BLD AUTO: 5.1 K/UL (ref 1.8–7.7)
NEUTROPHILS NFR BLD: 60.9 % (ref 38–73)
NITRITE UR QL STRIP: NEGATIVE
NRBC BLD-RTO: 0 /100 WBC
OPIATES UR QL SCN: NEGATIVE
PCP UR QL SCN>25 NG/ML: NEGATIVE
PH UR STRIP: 6 [PH] (ref 5–8)
PLATELET # BLD AUTO: 343 K/UL (ref 150–450)
PMV BLD AUTO: 9.4 FL (ref 9.2–12.9)
POTASSIUM SERPL-SCNC: 3.8 MMOL/L (ref 3.5–5.1)
PROT SERPL-MCNC: 7.7 G/DL (ref 6–8.4)
PROT UR QL STRIP: ABNORMAL
RBC # BLD AUTO: 5.02 M/UL (ref 4–5.4)
RBC #/AREA URNS HPF: 20 /HPF (ref 0–4)
SALICYLATES SERPL-MCNC: <5 MG/DL (ref 15–30)
SARS-COV-2 RDRP RESP QL NAA+PROBE: NEGATIVE
SODIUM SERPL-SCNC: 139 MMOL/L (ref 136–145)
SP GR UR STRIP: >=1.03 (ref 1–1.03)
TOXICOLOGY INFORMATION: ABNORMAL
TSH SERPL DL<=0.005 MIU/L-ACNC: 1.24 UIU/ML (ref 0.4–4)
URN SPEC COLLECT METH UR: ABNORMAL
UROBILINOGEN UR STRIP-ACNC: NEGATIVE EU/DL
WBC # BLD AUTO: 8.32 K/UL (ref 3.9–12.7)
WBC #/AREA URNS HPF: 6 /HPF (ref 0–5)

## 2022-09-18 PROCEDURE — 80307 DRUG TEST PRSMV CHEM ANLYZR: CPT | Performed by: INTERNAL MEDICINE

## 2022-09-18 PROCEDURE — 80179 DRUG ASSAY SALICYLATE: CPT | Performed by: INTERNAL MEDICINE

## 2022-09-18 PROCEDURE — 84443 ASSAY THYROID STIM HORMONE: CPT | Performed by: INTERNAL MEDICINE

## 2022-09-18 PROCEDURE — 99215 PR OFFICE/OUTPT VISIT, EST, LEVL V, 40-54 MIN: ICD-10-PCS | Mod: GT,,, | Performed by: STUDENT IN AN ORGANIZED HEALTH CARE EDUCATION/TRAINING PROGRAM

## 2022-09-18 PROCEDURE — 63600175 PHARM REV CODE 636 W HCPCS: Performed by: INTERNAL MEDICINE

## 2022-09-18 PROCEDURE — 36415 COLL VENOUS BLD VENIPUNCTURE: CPT | Performed by: INTERNAL MEDICINE

## 2022-09-18 PROCEDURE — 85025 COMPLETE CBC W/AUTO DIFF WBC: CPT | Performed by: INTERNAL MEDICINE

## 2022-09-18 PROCEDURE — 81000 URINALYSIS NONAUTO W/SCOPE: CPT | Mod: 59 | Performed by: INTERNAL MEDICINE

## 2022-09-18 PROCEDURE — 99285 EMERGENCY DEPT VISIT HI MDM: CPT | Mod: 25

## 2022-09-18 PROCEDURE — 99215 OFFICE O/P EST HI 40 MIN: CPT | Mod: GT,,, | Performed by: STUDENT IN AN ORGANIZED HEALTH CARE EDUCATION/TRAINING PROGRAM

## 2022-09-18 PROCEDURE — 82077 ASSAY SPEC XCP UR&BREATH IA: CPT | Performed by: INTERNAL MEDICINE

## 2022-09-18 PROCEDURE — 96372 THER/PROPH/DIAG INJ SC/IM: CPT | Performed by: INTERNAL MEDICINE

## 2022-09-18 PROCEDURE — U0002 COVID-19 LAB TEST NON-CDC: HCPCS | Performed by: INTERNAL MEDICINE

## 2022-09-18 PROCEDURE — 80143 DRUG ASSAY ACETAMINOPHEN: CPT | Performed by: INTERNAL MEDICINE

## 2022-09-18 PROCEDURE — 80053 COMPREHEN METABOLIC PANEL: CPT | Performed by: INTERNAL MEDICINE

## 2022-09-18 RX ORDER — LORAZEPAM 2 MG/ML
2 INJECTION INTRAMUSCULAR EVERY 4 HOURS PRN
Status: DISCONTINUED | OUTPATIENT
Start: 2022-09-18 | End: 2022-09-19 | Stop reason: HOSPADM

## 2022-09-18 RX ORDER — LORAZEPAM 2 MG/ML
1 INJECTION INTRAMUSCULAR
Status: COMPLETED | OUTPATIENT
Start: 2022-09-18 | End: 2022-09-18

## 2022-09-18 RX ADMIN — LORAZEPAM 1 MG: 2 INJECTION INTRAMUSCULAR; INTRAVENOUS at 05:09
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
## 2022-09-18 NOTE — CONSULTS
"[facility]  Psychiatry  Telepsychiatry Consult Note      Patient agreeable to consultation via telepsychiatry.    Tele-Consultation from Psychiatry started: 9/18/2022 at 4:23p  The chief complaint leading to psychiatric consultation is: psychosis  This consultation was requested by he Emergency Department attending physician.  The location of the consulting psychiatrist is [place], [place]  The patient location is  [facility] EMERGENCY DEPARTMENT   The patient arrived at the ED at: approx 3p  Also present with the patient at the time of the consultation: nurse    Patient Identification:   Rosa Maria Gibson is a 61 y.o. female.    Patient information was obtained from patient and chart review .  Patient presented voluntarily to the Emergency Department ambulatory.    Consults  Consult Start Time: 09/18/2022 16:18 CDT  Consult End Time: 09/18/2022 16:45 CDT      Subjective:     History of Present Illness:  Pt to the ED in search of mental healthy assistance. Pt states she is having auditory and visual hallucinations. She states that predators and terrorists are harassing her and following her and using internal electronics to torture her. She states that this has been occurring for an extended amount of time and that she has tried to handle it on her own for as long as she possibly could. Pt calm and cooperative, but tearful.    On interview with the patient,  She is pressured, hyperverbal, at times word salad. She is standing and eating dinner.     She recalls that she was at [employer] and a police or  convinced her to come over to the emergency room.     "I'm trying to get these people in residential. These people will not stop playing the games. Everything is so much misery with the demons."    Pt reports she frequently smokes mojo "and it ain't working too good." Pt reports no medications other than mojo. 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     "It's bigger than me. I'm burned out from over-riding and trying to contingually. [place] forty at a time, we are about to lose control. They are room and won't shut up."     Pt says "I have electronic pressure that's hurting me."   "I'm just a little confused and I think I made a mistake to come over here."    Collateral- pt cannot think of anyone to call    Psych Review of Symptoms: items highlighted should be considered positive    Depression/Mood: depression, changes in sleep, anhedonia, energy, appetite, concentration, psychomotor agitation/retardation, SI, HI. Denies euphoria, increased energy, grandiosity, decreased sleep, hyper-religiousity, impulsivity, distractibility, pleasure seeking.    Anxiety: panic attacks, ruminative thoughts, obsession/compulsions    Trauma: nightmares, flashbacks, avoidance, intrusive symptoms, dissociation/derealization, disordered eating, self-injurious behavior    Cognition: memory troubles, executive function concerns, "brain fog"    Psychosis: hallucinations, delusions, paranoia, persecutory thoughts, difficulty with reality testing    Biological considerations: no known hx hypothyroidism, b12 deficiency, syphillis, autoimmune disorders, strokes. Chronic medical diseases in the care of a primary care provider and well-controlled.    Psychiatric History:   Previous Psychiatric Hospitalizations: 7 day stay in April 2021 for psychosis  Previous Medication Trials: risperidone, lorazepam  Previous Suicide Attempts: none  History of Violence: none  History of Depression: unk  History of Tresa: yes  History of Auditory/Visual Hallucination: yes  History of Delusions: yes  Outpatient psychiatrist (current & past): Dr. 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     Relationship Status/Sexual Orientation: none  Children:  unk  Housing Status: housed    history: none  Access to gun:  unk  Rastafarian: unk  Recreation/Hobbies: likes to smoke weed    Psychiatric Mental Status Exam:  Arousal: alert  Sensorium/Orientation: oriented to grossly intact, person, place  Behavior/Cooperation: cooperative, psychomotor agitation, difficult to redirect    Speech: normal volume, pressured, rapid  Language: grossly intact  Mood: "I have electronic pressure in my head "   Affect: anxious  Thought Process: disorganized  Thought Content: perseverative on paranoia and delusions  Auditory hallucinations: denies  Visual hallucinations: yes  Paranoia: yes  Delusions:  yes  Suicidal ideation: none   Homicidal ideation: none  Attention/Concentration:  MITRA  Memory:    Recent:  Decreased   Remote: Decreased  Difficult to assess  Fund of Knowledge:  [facility]  Abstract reasoning: [facility]  Insight: poor awareness of illness  Judgment: impaired due to psychosis    Past Medical History:   Past Medical History:   Diagnosis Date    Bipolar affective disorder, manic, severe, with psychotic behavior 8/14/2014    Depression     Fatigue     History of psychiatric care     History of psychiatric hospitalization     Psychiatric problem     Psychosis 5/13/2015    Schizoaffective disorder     Schizophrenia       Laboratory Data:   Labs Reviewed   CBC W/ AUTO DIFFERENTIAL - Abnormal; Notable for the following components:       Result Value    MCV 79 (*)     MCH 24.9 (*)     MCHC 31.4 (*)     RDW 23.4 (*)     All other components within normal limits   COMPREHENSIVE METABOLIC PANEL - Abnormal; Notable for the following components:    CO2 22 (*)     All other components within normal limits   URINALYSIS, REFLEX TO URINE CULTURE - Abnormal; Notable for the following " components:    Specific Gravity, UA >=1.030 (*)     Protein, UA 1+ (*)     Bilirubin (UA) 1+ (*)     Occult Blood UA 1+ (*)     All other components within normal limits    Narrative:     Preferred Collection Type->Urine, Clean Catch  Specimen Source->Urine   DRUG SCREEN PANEL, URINE EMERGENCY - Abnormal; Notable for the following components:    THC Presumptive Positive (*)     Creatinine, Urine 392.7 (*)     All other components within normal limits    Narrative:     Preferred Collection Type->Urine, Clean Catch  Specimen Source->Urine   ACETAMINOPHEN LEVEL - Abnormal; Notable for the following components:    Acetaminophen (Tylenol), Serum <3.0 (*)     All other components within normal limits   SALICYLATE LEVEL - Abnormal; Notable for the following components:    Salicylate Lvl <5.0 (*)     All other components within normal limits   URINALYSIS MICROSCOPIC - Abnormal; Notable for the following components:    RBC, UA 20 (*)     WBC, UA 6 (*)     Bacteria Moderate (*)     All other components within normal limits    Narrative:     Preferred Collection Type->Urine, Clean Catch  Specimen Source->Urine   TSH   ALCOHOL,MEDICAL (ETHANOL)   SARS-COV-2 RNA AMPLIFICATION, QUAL    Narrative:     Is the patient symptomatic?->No       Neurological History:  Seizures: unk  Head trauma: unk    Allergies:   Review of patient's allergies indicates:   Allergen Reactions    Codeine Hives       Medications in ER: Medications - No data to display    Medications at home: none    No new subjective & objective note has been filed under this hospital service since the last note was generated.      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 Dispo/Legal Status: Cont involuntary hold at this time as the pt is currently gravely disabled due to an acute psychiatric illness. Seek inpt bed for pt safety and stabilization when/if  medically cleared by the ER team. Patient would benefit from Geriatric Psych placement.  2. Scheduled Medications: restart Risperdal 1mg BID. Defer changes to primary inpt team. Defer any non-psych meds to the ER MD.  3. PRN Medications: Haldol and Ativan prn non-redirectable agitation associated with breakthrough psychosis or kirit if needed to help the pt more effectively interact with her environment.   4. Precautions/Nursing: elopement, fall  5. To-Do: Continue to observe pt's behavior while in the ER and will reassess the pt daily until placement is found.        Time with patient: 12 minutes      More than 50% of the time was spent counseling/coordinating care    Consulting clinician was informed of the encounter and consult note.    Consultation ended: 9/18/2022 at 4:45p    Raven Garcia MD   Psychiatry  [facility]

## 2022-09-18 NOTE — ED NOTES
Pt to the ED in search of mental healthy assistance. Pt states she is having auditory and visual hallucinations. She states that predators and terrorists are harassing her and following her and using internal electronics to torture her. She states that this has been occurring for an extended amount of time and that she has tried to handle it on her own for as long as she possibly could. Pt calm and cooperative, but tearful.

## 2022-09-18 NOTE — ED PROVIDER NOTES
Encounter Date: 9/18/2022       History     Chief Complaint   Patient presents with    Mental Health Problem     Patient reports that she is being harassed by predators that are torturing her with internal electronics for a while. And she feels like she needs to be hospitalized to get medication.       Patient presents with hearing voices and schizoid phrenic psychosis for on on peer time.  The patient states that they been harassing her for some time now.  They would not leave her lawn.  She has seen here and she has been transferred for psychiatric evaluation.  She denies any suicidal what ideation but wants some help.      Review of patient's allergies indicates:   Allergen Reactions    Codeine Hives     Past Medical History:   Diagnosis Date    Bipolar affective disorder, manic, severe, with psychotic behavior 8/14/2014    Depression     Fatigue     History of psychiatric care     History of psychiatric hospitalization     Psychiatric problem     Psychosis 5/13/2015    Schizoaffective disorder     Schizophrenia      Past Surgical History:   Procedure Laterality Date    ANKLE SURGERY Right     TONSILLECTOMY       Family History   Problem Relation Age of Onset    No Known Problems Mother     No Known Problems Father     No Known Problems Sister     No Known Problems Brother     No Known Problems Maternal Aunt     No Known Problems Paternal Aunt     No Known Problems Maternal Uncle     No Known Problems Paternal Uncle     No Known Problems Maternal Grandfather     No Known Problems Maternal Grandmother     No Known Problems Paternal Grandfather     No Known Problems Paternal Grandmother     No Known Problems Cousin     ADD / ADHD Neg Hx     Alcohol abuse Neg Hx     Anxiety disorder Neg Hx     Bipolar disorder Neg Hx     Dementia Neg Hx     Depression Neg Hx     Drug abuse Neg Hx     OCD Neg Hx     Paranoid behavior Neg Hx     Physical abuse Neg Hx     Schizophrenia Neg Hx     Seizures Neg Hx     Self injury Neg Hx   "   Sexual abuse Neg Hx     Suicide Neg Hx      Social History     Tobacco Use    Smoking status: Every Day     Packs/day: 0.50     Years: 34.00     Pack years: 17.00     Types: Cigarettes    Smokeless tobacco: Never   Substance Use Topics    Alcohol use: No    Drug use: Yes     Types: Marijuana     Comment: pt. reports "medical marijiana"     Review of Systems   Constitutional:  Negative for fever.   HENT:  Negative for sore throat.    Respiratory:  Negative for shortness of breath.    Cardiovascular:  Negative for chest pain.   Gastrointestinal:  Negative for nausea.   Genitourinary:  Negative for dysuria.   Musculoskeletal:  Negative for back pain.   Skin:  Negative for rash.   Neurological:  Negative for weakness.   Hematological:  Does not bruise/bleed easily.     Physical Exam     Initial Vitals [09/18/22 1346]   BP Pulse Resp Temp SpO2   (!) 107/90 101 19 98.4 °F (36.9 °C) 97 %      MAP       --         Physical Exam    Vitals reviewed.  Constitutional: She appears well-developed.   Eyes: Pupils are equal, round, and reactive to light.   Neck:   Normal range of motion.  Cardiovascular:  Normal rate.           Pulmonary/Chest: Breath sounds normal.   Abdominal: Abdomen is soft.   Musculoskeletal:         General: Normal range of motion.      Cervical back: Normal range of motion.     Neurological: She is alert. She has normal strength and normal reflexes. No cranial nerve deficit. GCS eye subscore is 4. GCS verbal subscore is 5. GCS motor subscore is 6.   Skin: Skin is warm.   Psychiatric: Her mood appears anxious. Her speech is rapid and/or pressured. She is agitated and hyperactive. Thought content is paranoid and delusional.       ED Course   Procedures  Labs Reviewed   CBC W/ AUTO DIFFERENTIAL - Abnormal; Notable for the following components:       Result Value    MCV 79 (*)     MCH 24.9 (*)     MCHC 31.4 (*)     RDW 23.4 (*)     All other components within normal limits   COMPREHENSIVE METABOLIC PANEL - " Abnormal; Notable for the following components:    CO2 22 (*)     All other components within normal limits   URINALYSIS, REFLEX TO URINE CULTURE - Abnormal; Notable for the following components:    Specific Gravity, UA >=1.030 (*)     Protein, UA 1+ (*)     Bilirubin (UA) 1+ (*)     Occult Blood UA 1+ (*)     All other components within normal limits    Narrative:     Preferred Collection Type->Urine, Clean Catch  Specimen Source->Urine   DRUG SCREEN PANEL, URINE EMERGENCY - Abnormal; Notable for the following components:    THC Presumptive Positive (*)     Creatinine, Urine 392.7 (*)     All other components within normal limits    Narrative:     Preferred Collection Type->Urine, Clean Catch  Specimen Source->Urine   ACETAMINOPHEN LEVEL - Abnormal; Notable for the following components:    Acetaminophen (Tylenol), Serum <3.0 (*)     All other components within normal limits   SALICYLATE LEVEL - Abnormal; Notable for the following components:    Salicylate Lvl <5.0 (*)     All other components within normal limits   URINALYSIS MICROSCOPIC - Abnormal; Notable for the following components:    RBC, UA 20 (*)     WBC, UA 6 (*)     Bacteria Moderate (*)     All other components within normal limits    Narrative:     Preferred Collection Type->Urine, Clean Catch  Specimen Source->Urine   TSH   ALCOHOL,MEDICAL (ETHANOL)   SARS-COV-2 RNA AMPLIFICATION, QUAL    Narrative:     Is the patient symptomatic?->No          Imaging Results    None          Medications   lorazepam injection 2 mg (has no administration in time range)   lorazepam injection 1 mg (1 mg Intramuscular Given 9/18/22 1713)                 ED Course as of 09/18/22 1722   Sun Sep 18, 2022   1426 CBC auto differential(!) [PW]   1426 Urinalysis, Reflex to Urine Culture Urine, Clean Catch(!) [PW]   1426 SARS-CoV-2 RNA, Amplification, Qual: Negative [PW]   1446 Drug screen panel, emergency(!) [PW]   1447 Marijuana (THC) Metabolite(!): Presumptive Positive [PW]    1510 TSH: 1.236 [PW]   1549 Salicylate Lvl(!): <5.0 [PW]   1549 Alcohol, Serum: <10 [PW]   1549 Acetaminophen (Tylenol), Serum(!): <3.0 [PW]   1549 Comprehensive metabolic panel(!) [PW]   1551 Will consult tele psychiatry. [PW]   1657 PSYCHIATRY EVALUATOR AND FELT THIS POINT SHE NEEDS AGGRESSIVE INPATIENT MANAGEMENT, CAN USE ATIVAN OR RISK FOR DONE FOR SEDATION [PW]      ED Course User Index  [PW] Romeo Barger MD                 Clinical Impression:   Final diagnoses:  [F23] Acute psychosis (Primary)  [R44.3] Hallucinations  [F23] Schizophrenia, acute  [Z00.8] Medical clearance for psychiatric admission      ED Disposition Condition    Transfer to Psych Facility Stable          ED Prescriptions    None       Follow-up Information    None          Romeo Barger MD  09/18/22 1702       Romeo Barger MD  09/18/22 1718       Romeo Barger MD  09/18/22 1722

## 2022-09-19 NOTE — ED NOTES
Report called to Josee at [facility], [phone]; will update when patient is transported by [facility]

## 2022-09-19 NOTE — ED NOTES
FRAN at bedside to transfer patient to [facility]; phone call to Josee at [facility] to let her know the patient is on her way

## 2022-10-07 ENCOUNTER — HOSPITAL ENCOUNTER (EMERGENCY)
Facility: HOSPITAL | Age: 62
Discharge: HOME | End: 2022-10-07
Attending: EMERGENCY MEDICINE
Payer: MEDICAID

## 2022-10-07 VITALS
RESPIRATION RATE: 20 BRPM | DIASTOLIC BLOOD PRESSURE: 89 MMHG | BODY MASS INDEX: 27.97 KG/M2 | TEMPERATURE: 98 F | WEIGHT: 152 LBS | OXYGEN SATURATION: 96 % | SYSTOLIC BLOOD PRESSURE: 125 MMHG | HEIGHT: 62 IN | HEART RATE: 98 BPM

## 2022-10-07 DIAGNOSIS — H92.01 RIGHT EAR PAIN: Primary | ICD-10-CM

## 2022-10-07 DIAGNOSIS — H66.001 ACUTE SUPPURATIVE OTITIS MEDIA OF RIGHT EAR WITHOUT SPONTANEOUS RUPTURE OF TYMPANIC MEMBRANE, RECURRENCE NOT SPECIFIED: ICD-10-CM

## 2022-10-07 PROCEDURE — 99283 EMERGENCY DEPT VISIT LOW MDM: CPT

## 2022-10-07 RX ORDER — AZITHROMYCIN 500 MG/1
500 TABLET, FILM COATED ORAL DAILY
Qty: 3 TABLET | Refills: 0 | Status: SHIPPED | OUTPATIENT
Start: 2022-10-07 | End: 2022-10-10

## 2022-10-07 NOTE — DISCHARGE INSTRUCTIONS
Take the medications as prescribed. Call Dr. Johnson' office for appointment. Return for any worsening or new symptoms. Follow up with Primary Care Provider in the next 2-3 days.

## 2022-10-08 NOTE — ED PROVIDER NOTES
Encounter Date: 10/7/2022       History     Chief Complaint   Patient presents with    Otalgia     Right ear pain for 4 days     The history is provided by the patient.   Otalgia  This is a new problem. The current episode started several days ago. There is pain in the right ear. The problem occurs constantly. The problem has been unchanged. The pain is at a severity of 8/10. Pertinent negatives include no ear discharge and no hearing loss.   Review of patient's allergies indicates:   Allergen Reactions    Codeine Hives     Past Medical History:   Diagnosis Date    Bipolar affective disorder, manic, severe, with psychotic behavior 8/14/2014    Depression     Fatigue     History of psychiatric care     History of psychiatric hospitalization     Psychiatric problem     Psychosis 5/13/2015    Schizoaffective disorder     Schizophrenia      Past Surgical History:   Procedure Laterality Date    ANKLE SURGERY Right     TONSILLECTOMY       Family History   Problem Relation Age of Onset    No Known Problems Mother     No Known Problems Father     No Known Problems Sister     No Known Problems Brother     No Known Problems Maternal Aunt     No Known Problems Paternal Aunt     No Known Problems Maternal Uncle     No Known Problems Paternal Uncle     No Known Problems Maternal Grandfather     No Known Problems Maternal Grandmother     No Known Problems Paternal Grandfather     No Known Problems Paternal Grandmother     No Known Problems Cousin     ADD / ADHD Neg Hx     Alcohol abuse Neg Hx     Anxiety disorder Neg Hx     Bipolar disorder Neg Hx     Dementia Neg Hx     Depression Neg Hx     Drug abuse Neg Hx     OCD Neg Hx     Paranoid behavior Neg Hx     Physical abuse Neg Hx     Schizophrenia Neg Hx     Seizures Neg Hx     Self injury Neg Hx     Sexual abuse Neg Hx     Suicide Neg Hx      Social History     Tobacco Use    Smoking status: Every Day     Packs/day: 0.50     Years: 34.00     Pack years: 17.00     Types:  "Cigarettes    Smokeless tobacco: Never   Substance Use Topics    Alcohol use: No    Drug use: Yes     Types: Marijuana     Comment: pt. reports "medical marijiana"     Review of Systems   HENT:  Positive for ear pain. Negative for ear discharge and hearing loss.    All other systems reviewed and are negative.    Physical Exam     Initial Vitals [10/07/22 1159]   BP Pulse Resp Temp SpO2   125/89 98 20 98.1 °F (36.7 °C) 96 %      MAP       --         Physical Exam    Nursing note and vitals reviewed.  Constitutional: She appears well-developed and well-nourished. No distress.   HENT:   Head: Normocephalic and atraumatic.   Right Ear: A middle ear effusion (suppurative) is present.   Left Ear: A middle ear effusion is present.   Eyes: EOM are normal. Pupils are equal, round, and reactive to light.   Neck:   Normal range of motion.  Cardiovascular:  Normal rate and regular rhythm.           No murmur heard.  Pulmonary/Chest: Breath sounds normal. No respiratory distress. She has no wheezes. She has no rhonchi. She has no rales. She exhibits no tenderness.   Abdominal: Abdomen is soft.   Musculoskeletal:         General: Normal range of motion.      Cervical back: Normal range of motion.     Neurological: She is alert and oriented to person, place, and time. She has normal strength. GCS score is 15. GCS eye subscore is 4. GCS verbal subscore is 5. GCS motor subscore is 6.   Skin: Skin is warm and dry.   Psychiatric: She has a normal mood and affect.       ED Course   Procedures  Labs Reviewed - No data to display       Imaging Results    None          Medications - No data to display                           Clinical Impression:   Final diagnoses:  [H92.01] Right ear pain (Primary)  [H66.001] Acute suppurative otitis media of right ear without spontaneous rupture of tympanic membrane, recurrence not specified        ED Disposition Condition    Discharge Stable          ED Prescriptions       Medication Sig Dispense " Start Date End Date Auth. Provider    azithromycin (ZITHROMAX) 500 MG tablet Take 1 tablet (500 mg total) by mouth once daily. for 3 days 3 tablet 10/7/2022 10/10/2022 Grazyna Ruiz NP          Follow-up Information       Follow up With Specialties Details Why Contact Info    ENT (DR. Johnson)  Call   [address] [facility] [place] [address] [phone]    [facility] [place], operated by [facility] Emergency Medicine  If symptoms worsen [address] [address] [phone]             Grazyna Ruiz NP  10/08/22 0002

## 2023-01-04 ENCOUNTER — HOSPITAL ENCOUNTER (EMERGENCY)
Facility: HOSPITAL | Age: 63
Discharge: PSYCHIATRIC OR SUBSTANCE USE FACILITY | End: 2023-01-04
Attending: FAMILY MEDICINE
Payer: MEDICAID

## 2023-01-04 VITALS
HEART RATE: 83 BPM | BODY MASS INDEX: 25.03 KG/M2 | TEMPERATURE: 99 F | WEIGHT: 136 LBS | SYSTOLIC BLOOD PRESSURE: 114 MMHG | OXYGEN SATURATION: 99 % | HEIGHT: 62 IN | RESPIRATION RATE: 16 BRPM | DIASTOLIC BLOOD PRESSURE: 74 MMHG

## 2023-01-04 DIAGNOSIS — F22 PARANOIA: ICD-10-CM

## 2023-01-04 DIAGNOSIS — F30.10 MANIC BEHAVIOR: Primary | ICD-10-CM

## 2023-01-04 DIAGNOSIS — F23 ACUTE PSYCHOSIS: ICD-10-CM

## 2023-01-04 LAB
ALBUMIN SERPL BCP-MCNC: 3.7 G/DL (ref 3.5–5.2)
ALP SERPL-CCNC: 61 U/L (ref 55–135)
ALT SERPL W/O P-5'-P-CCNC: 8 U/L (ref 10–44)
AMPHET+METHAMPHET UR QL: NEGATIVE
ANION GAP SERPL CALC-SCNC: 9 MMOL/L (ref 8–16)
APAP SERPL-MCNC: <3 UG/ML (ref 10–20)
AST SERPL-CCNC: 10 U/L (ref 10–40)
BARBITURATES UR QL SCN>200 NG/ML: NEGATIVE
BASOPHILS # BLD AUTO: 0.06 K/UL (ref 0–0.2)
BASOPHILS NFR BLD: 0.9 % (ref 0–1.9)
BENZODIAZ UR QL SCN>200 NG/ML: NEGATIVE
BILIRUB SERPL-MCNC: 0.4 MG/DL (ref 0.1–1)
BILIRUB UR QL STRIP: NEGATIVE
BUN SERPL-MCNC: 9 MG/DL (ref 6–20)
BZE UR QL SCN: NEGATIVE
CALCIUM SERPL-MCNC: 9.2 MG/DL (ref 8.7–10.5)
CANNABINOIDS UR QL SCN: ABNORMAL
CHLORIDE SERPL-SCNC: 106 MMOL/L (ref 95–110)
CLARITY UR: CLEAR
CO2 SERPL-SCNC: 24 MMOL/L (ref 23–29)
COLOR UR: YELLOW
CREAT SERPL-MCNC: 0.8 MG/DL (ref 0.5–1.4)
CREAT UR-MCNC: 33 MG/DL (ref 15–325)
DIFFERENTIAL METHOD: ABNORMAL
EOSINOPHIL # BLD AUTO: 0.2 K/UL (ref 0–0.5)
EOSINOPHIL NFR BLD: 2.7 % (ref 0–8)
ERYTHROCYTE [DISTWIDTH] IN BLOOD BY AUTOMATED COUNT: 20.5 % (ref 11.5–14.5)
EST. GFR  (NO RACE VARIABLE): >60 ML/MIN/1.73 M^2
ETHANOL SERPL-MCNC: <10 MG/DL (ref 0–10)
GLUCOSE SERPL-MCNC: 88 MG/DL (ref 70–110)
GLUCOSE UR QL STRIP: NEGATIVE
HCT VFR BLD AUTO: 39.2 % (ref 37–48.5)
HGB BLD-MCNC: 12.5 G/DL (ref 12–16)
HGB UR QL STRIP: NEGATIVE
IMM GRANULOCYTES # BLD AUTO: 0.02 K/UL (ref 0–0.04)
IMM GRANULOCYTES NFR BLD AUTO: 0.3 % (ref 0–0.5)
KETONES UR QL STRIP: NEGATIVE
LEUKOCYTE ESTERASE UR QL STRIP: NEGATIVE
LYMPHOCYTES # BLD AUTO: 2.1 K/UL (ref 1–4.8)
LYMPHOCYTES NFR BLD: 31.9 % (ref 18–48)
MCH RBC QN AUTO: 26.8 PG (ref 27–31)
MCHC RBC AUTO-ENTMCNC: 31.9 G/DL (ref 32–36)
MCV RBC AUTO: 84 FL (ref 82–98)
METHADONE UR QL SCN>300 NG/ML: NEGATIVE
MONOCYTES # BLD AUTO: 0.6 K/UL (ref 0.3–1)
MONOCYTES NFR BLD: 8.7 % (ref 4–15)
NEUTROPHILS # BLD AUTO: 3.7 K/UL (ref 1.8–7.7)
NEUTROPHILS NFR BLD: 55.5 % (ref 38–73)
NITRITE UR QL STRIP: NEGATIVE
NRBC BLD-RTO: 0 /100 WBC
OPIATES UR QL SCN: NEGATIVE
PCP UR QL SCN>25 NG/ML: NEGATIVE
PH UR STRIP: 6 [PH] (ref 5–8)
PLATELET # BLD AUTO: 322 K/UL (ref 150–450)
PMV BLD AUTO: 9.3 FL (ref 9.2–12.9)
POTASSIUM SERPL-SCNC: 4 MMOL/L (ref 3.5–5.1)
PROT SERPL-MCNC: 6.9 G/DL (ref 6–8.4)
PROT UR QL STRIP: NEGATIVE
RBC # BLD AUTO: 4.67 M/UL (ref 4–5.4)
SALICYLATES SERPL-MCNC: <5 MG/DL (ref 15–30)
SARS-COV-2 RDRP RESP QL NAA+PROBE: NEGATIVE
SODIUM SERPL-SCNC: 139 MMOL/L (ref 136–145)
SP GR UR STRIP: <=1.005 (ref 1–1.03)
TOXICOLOGY INFORMATION: ABNORMAL
TSH SERPL DL<=0.005 MIU/L-ACNC: 1.52 UIU/ML (ref 0.4–4)
URN SPEC COLLECT METH UR: NORMAL
UROBILINOGEN UR STRIP-ACNC: NEGATIVE EU/DL
WBC # BLD AUTO: 6.7 K/UL (ref 3.9–12.7)

## 2023-01-04 PROCEDURE — 85025 COMPLETE CBC W/AUTO DIFF WBC: CPT | Performed by: FAMILY MEDICINE

## 2023-01-04 PROCEDURE — 84443 ASSAY THYROID STIM HORMONE: CPT | Performed by: FAMILY MEDICINE

## 2023-01-04 PROCEDURE — 63600175 PHARM REV CODE 636 W HCPCS: Performed by: FAMILY MEDICINE

## 2023-01-04 PROCEDURE — 99285 EMERGENCY DEPT VISIT HI MDM: CPT

## 2023-01-04 PROCEDURE — 82077 ASSAY SPEC XCP UR&BREATH IA: CPT | Performed by: FAMILY MEDICINE

## 2023-01-04 PROCEDURE — 80179 DRUG ASSAY SALICYLATE: CPT | Performed by: FAMILY MEDICINE

## 2023-01-04 PROCEDURE — U0002 COVID-19 LAB TEST NON-CDC: HCPCS | Performed by: FAMILY MEDICINE

## 2023-01-04 PROCEDURE — 80143 DRUG ASSAY ACETAMINOPHEN: CPT | Performed by: FAMILY MEDICINE

## 2023-01-04 PROCEDURE — 80053 COMPREHEN METABOLIC PANEL: CPT | Performed by: FAMILY MEDICINE

## 2023-01-04 PROCEDURE — 96372 THER/PROPH/DIAG INJ SC/IM: CPT | Performed by: FAMILY MEDICINE

## 2023-01-04 PROCEDURE — 81003 URINALYSIS AUTO W/O SCOPE: CPT | Mod: 59 | Performed by: FAMILY MEDICINE

## 2023-01-04 PROCEDURE — 80307 DRUG TEST PRSMV CHEM ANLYZR: CPT | Performed by: FAMILY MEDICINE

## 2023-01-04 RX ORDER — LORAZEPAM 2 MG/ML
1 INJECTION INTRAMUSCULAR
Status: COMPLETED | OUTPATIENT
Start: 2023-01-04 | End: 2023-01-04

## 2023-01-04 RX ORDER — HALOPERIDOL 5 MG/ML
10 INJECTION INTRAMUSCULAR
Status: COMPLETED | OUTPATIENT
Start: 2023-01-04 | End: 2023-01-04

## 2023-01-04 RX ORDER — DIPHENHYDRAMINE HYDROCHLORIDE 50 MG/ML
50 INJECTION INTRAMUSCULAR; INTRAVENOUS
Status: COMPLETED | OUTPATIENT
Start: 2023-01-04 | End: 2023-01-04

## 2023-01-04 RX ADMIN — LORAZEPAM 1 MG: 2 INJECTION INTRAMUSCULAR; INTRAVENOUS at 02:01

## 2023-01-04 RX ADMIN — HALOPERIDOL LACTATE 10 MG: 5 INJECTION, SOLUTION INTRAMUSCULAR at 02:01

## 2023-01-04 RX ADMIN — DIPHENHYDRAMINE HYDROCHLORIDE 50 MG: 50 INJECTION INTRAMUSCULAR; INTRAVENOUS at 02:01

## 2023-01-04 NOTE — ED NOTES
"Pt much more reasonable upon reassessment after medication admin. Pt verbalizes feeling much better requesting she go home so she can grab some [employer]. Pt denies si or hi. Pt denies auditory or visual hallucinations. Pt still stating "the  our working on the electronics in her head but they will figure it out for her."   "

## 2023-01-04 NOTE — ED PROVIDER NOTES
Encounter Date: 1/4/2023       History     Chief Complaint   Patient presents with    Manic Behavior     Rapid speech. States that she is being monitored by a computer who told her to get help. Complains of right sided headache. States that she was a prescription for medical marijuana to get her pressure down. Auditory hallucinations      8-year-old female presents to the ED ambulatory complaining of electronic devices attempting to enter her brain and control her she states that she called the police and they are trying to stop the attempts to control her brain with electrical phone impulses, review of her records show a number of prior psychiatric hallucinations and delusions including paranoia patient has a listed diagnosis schizoaffective disorder and schizophrenia, the patient would not state if she is taking her medication    Review of patient's allergies indicates:   Allergen Reactions    Codeine Hives     Past Medical History:   Diagnosis Date    Bipolar affective disorder, manic, severe, with psychotic behavior 8/14/2014    Depression     Fatigue     History of psychiatric care     History of psychiatric hospitalization     Psychiatric problem     Psychosis 5/13/2015    Schizoaffective disorder     Schizophrenia      Past Surgical History:   Procedure Laterality Date    ANKLE SURGERY Right     TONSILLECTOMY       Family History   Problem Relation Age of Onset    No Known Problems Mother     No Known Problems Father     No Known Problems Sister     No Known Problems Brother     No Known Problems Maternal Aunt     No Known Problems Paternal Aunt     No Known Problems Maternal Uncle     No Known Problems Paternal Uncle     No Known Problems Maternal Grandfather     No Known Problems Maternal Grandmother     No Known Problems Paternal Grandfather     No Known Problems Paternal Grandmother     No Known Problems Cousin     ADD / ADHD Neg Hx     Alcohol abuse Neg Hx     Anxiety disorder Neg Hx     Bipolar disorder  "Neg Hx     Dementia Neg Hx     Depression Neg Hx     Drug abuse Neg Hx     OCD Neg Hx     Paranoid behavior Neg Hx     Physical abuse Neg Hx     Schizophrenia Neg Hx     Seizures Neg Hx     Self injury Neg Hx     Sexual abuse Neg Hx     Suicide Neg Hx      Social History     Tobacco Use    Smoking status: Every Day     Packs/day: 0.50     Years: 34.00     Pack years: 17.00     Types: Cigarettes    Smokeless tobacco: Never   Substance Use Topics    Alcohol use: No    Drug use: Yes     Types: Marijuana     Comment: pt. reports "medical marijiana"     Review of Systems   Constitutional:  Negative for fever.   HENT:  Negative for sore throat.    Respiratory:  Negative for shortness of breath.    Cardiovascular:  Negative for chest pain.   Gastrointestinal:  Negative for nausea.   Genitourinary:  Negative for dysuria.   Musculoskeletal:  Negative for back pain.   Skin:  Negative for rash.   Allergic/Immunologic: Negative for environmental allergies.   Neurological:  Negative for weakness.   Hematological:  Does not bruise/bleed easily.   Psychiatric/Behavioral:  Positive for agitation, confusion, hallucinations and sleep disturbance. The patient is nervous/anxious.      Physical Exam     Initial Vitals [01/04/23 1346]   BP Pulse Resp Temp SpO2   114/79 94 20 98.5 °F (36.9 °C) 96 %      MAP       --         Physical Exam    Nursing note and vitals reviewed.  Constitutional: She appears well-developed and well-nourished. She is not diaphoretic. No distress.   HENT:   Head: Normocephalic and atraumatic.   Eyes: Pupils are equal, round, and reactive to light. Right eye exhibits no discharge. Left eye exhibits no discharge.   Neck: No tracheal deviation present. No JVD present.   Cardiovascular:      Exam reveals no friction rub.       No murmur heard.  Pulmonary/Chest: No stridor. No respiratory distress. She has no wheezes. She has no rales.   Abdominal: Bowel sounds are normal. She exhibits no distension. "   Musculoskeletal:         General: Normal range of motion.     Neurological: She is alert. She exhibits normal muscle tone. GCS eye subscore is 4. GCS verbal subscore is 5. GCS motor subscore is 6.   Skin: Skin is warm.   Psychiatric: She has a normal mood and affect.       ED Course   Procedures  Labs Reviewed   CBC W/ AUTO DIFFERENTIAL - Abnormal; Notable for the following components:       Result Value    MCH 26.8 (*)     MCHC 31.9 (*)     RDW 20.5 (*)     All other components within normal limits   COMPREHENSIVE METABOLIC PANEL - Abnormal; Notable for the following components:    ALT 8 (*)     All other components within normal limits   DRUG SCREEN PANEL, URINE EMERGENCY - Abnormal; Notable for the following components:    THC Presumptive Positive (*)     All other components within normal limits    Narrative:     Preferred Collection Type->Urine, Clean Catch  Specimen Source->Urine   ACETAMINOPHEN LEVEL - Abnormal; Notable for the following components:    Acetaminophen (Tylenol), Serum <3.0 (*)     All other components within normal limits   SALICYLATE LEVEL - Abnormal; Notable for the following components:    Salicylate Lvl <5.0 (*)     All other components within normal limits   TSH   URINALYSIS, REFLEX TO URINE CULTURE    Narrative:     Preferred Collection Type->Urine, Clean Catch  Specimen Source->Urine   ALCOHOL,MEDICAL (ETHANOL)   SARS-COV-2 RNA AMPLIFICATION, QUAL    Narrative:     Is the patient symptomatic?->No          Imaging Results    None          Medications   haloperidol lactate injection 10 mg (10 mg Intramuscular Given 1/4/23 1438)   diphenhydrAMINE injection 50 mg (50 mg Intramuscular Given 1/4/23 1438)   LORazepam injection 1 mg (1 mg Intramuscular Given 1/4/23 1438)                    Medically cleared for psychiatry placement: 1/4/2023  4:11 PM         Clinical Impression:   Final diagnoses:  [F30.10] Manic behavior (Primary)  [F23] Acute psychosis  [F22] Paranoia        ED Disposition  Condition    Transfer to Psych Facility Stable          ED Prescriptions    None       Follow-up Information    None          Trey Kulkarni MD  01/04/23 7793

## 2023-01-05 NOTE — ED NOTES
Called [facility] to notify that Dr. Kulkarni has completed and signed his noted per request. Spoke with Carmela.

## 2023-01-05 NOTE — ED NOTES
Carmela with CPT called with patient accepted by Dr. Ferguson at [facility]. Number for report [phone]. CPT to set up transport with [facility].

## 2023-01-05 NOTE — ED NOTES
Pt report received via bedside from JEFF Gaytan.  Pt resting comfortably in bed at this time.  Asleep,  resp e/u, chest rise and fall noted. NAD noted.  Pt easy to arouse.  Introduced self.  Pt updated on plan of care.  No questions or needs voiced at this time.  Pt remains in full view of sitter at all times. Will continue to monitor.

## 2024-01-03 NOTE — HIM RECORD RETIREMENT NOTE
[facility] Record    1/3/24    Patient Name: Rosa Maria Gibson  Contact Serial # (CSN): [id number]  Patient Medical Record # (MRN): [id number]  Date of Service: Hospital Encounter on 7/13/2022  Physician Name: Galileo Jenkins NP [[id number]     This record has been reviewed and is being retired as incomplete by the approval of the  Medical Executive Committee ([facility])     On 08/21/2023., due to:  Unavailability of Provider     Missing Information/Comments:  []    Discharge Summary   []    DC Note/Short Stay Summary   [x]    ED Provider Note   []    Delivery Note   []    History & Physical   []   Operative Note   []     Procedure Note   []     Physician Order   []     Verbal Order   []       Other, specify:
